# Patient Record
Sex: MALE | Race: ASIAN | NOT HISPANIC OR LATINO | URBAN - METROPOLITAN AREA
[De-identification: names, ages, dates, MRNs, and addresses within clinical notes are randomized per-mention and may not be internally consistent; named-entity substitution may affect disease eponyms.]

---

## 2019-06-25 PROBLEM — Z00.00 ENCOUNTER FOR PREVENTIVE HEALTH EXAMINATION: Status: ACTIVE | Noted: 2019-06-25

## 2020-01-23 ENCOUNTER — EMERGENCY (EMERGENCY)
Facility: HOSPITAL | Age: 75
LOS: 1 days | Discharge: HOME | End: 2020-01-23
Attending: EMERGENCY MEDICINE | Admitting: EMERGENCY MEDICINE
Payer: MEDICARE

## 2020-01-23 VITALS
HEART RATE: 97 BPM | WEIGHT: 190.04 LBS | RESPIRATION RATE: 16 BRPM | OXYGEN SATURATION: 99 % | TEMPERATURE: 98 F | HEIGHT: 68 IN | SYSTOLIC BLOOD PRESSURE: 103 MMHG | DIASTOLIC BLOOD PRESSURE: 55 MMHG

## 2020-01-23 VITALS
RESPIRATION RATE: 16 BRPM | SYSTOLIC BLOOD PRESSURE: 106 MMHG | OXYGEN SATURATION: 100 % | TEMPERATURE: 98 F | DIASTOLIC BLOOD PRESSURE: 65 MMHG | HEART RATE: 84 BPM

## 2020-01-23 LAB
ALBUMIN SERPL ELPH-MCNC: 3.6 G/DL — SIGNIFICANT CHANGE UP (ref 3.3–5)
ALP SERPL-CCNC: 84 U/L — SIGNIFICANT CHANGE UP (ref 40–120)
ALT FLD-CCNC: 27 U/L — SIGNIFICANT CHANGE UP (ref 12–78)
ANION GAP SERPL CALC-SCNC: 7 MMOL/L — SIGNIFICANT CHANGE UP (ref 5–17)
AST SERPL-CCNC: 21 U/L — SIGNIFICANT CHANGE UP (ref 15–37)
BASOPHILS # BLD AUTO: 0.05 K/UL — SIGNIFICANT CHANGE UP (ref 0–0.2)
BASOPHILS NFR BLD AUTO: 0.6 % — SIGNIFICANT CHANGE UP (ref 0–2)
BILIRUB SERPL-MCNC: 0.2 MG/DL — SIGNIFICANT CHANGE UP (ref 0.2–1.2)
BUN SERPL-MCNC: 26 MG/DL — HIGH (ref 7–23)
CALCIUM SERPL-MCNC: 8.6 MG/DL — SIGNIFICANT CHANGE UP (ref 8.5–10.1)
CHLORIDE SERPL-SCNC: 105 MMOL/L — SIGNIFICANT CHANGE UP (ref 96–108)
CO2 SERPL-SCNC: 25 MMOL/L — SIGNIFICANT CHANGE UP (ref 22–31)
CREAT SERPL-MCNC: 1.5 MG/DL — HIGH (ref 0.5–1.3)
EOSINOPHIL # BLD AUTO: 0.09 K/UL — SIGNIFICANT CHANGE UP (ref 0–0.5)
EOSINOPHIL NFR BLD AUTO: 1 % — SIGNIFICANT CHANGE UP (ref 0–6)
GLUCOSE SERPL-MCNC: 144 MG/DL — HIGH (ref 70–99)
HCT VFR BLD CALC: 38.1 % — LOW (ref 39–50)
HGB BLD-MCNC: 12.6 G/DL — LOW (ref 13–17)
IMM GRANULOCYTES NFR BLD AUTO: 0.6 % — SIGNIFICANT CHANGE UP (ref 0–1.5)
LIDOCAIN IGE QN: 117 U/L — SIGNIFICANT CHANGE UP (ref 73–393)
LYMPHOCYTES # BLD AUTO: 2.53 K/UL — SIGNIFICANT CHANGE UP (ref 1–3.3)
LYMPHOCYTES # BLD AUTO: 29.4 % — SIGNIFICANT CHANGE UP (ref 13–44)
MCHC RBC-ENTMCNC: 30.4 PG — SIGNIFICANT CHANGE UP (ref 27–34)
MCHC RBC-ENTMCNC: 33.1 GM/DL — SIGNIFICANT CHANGE UP (ref 32–36)
MCV RBC AUTO: 92 FL — SIGNIFICANT CHANGE UP (ref 80–100)
MONOCYTES # BLD AUTO: 0.7 K/UL — SIGNIFICANT CHANGE UP (ref 0–0.9)
MONOCYTES NFR BLD AUTO: 8.1 % — SIGNIFICANT CHANGE UP (ref 2–14)
NEUTROPHILS # BLD AUTO: 5.2 K/UL — SIGNIFICANT CHANGE UP (ref 1.8–7.4)
NEUTROPHILS NFR BLD AUTO: 60.3 % — SIGNIFICANT CHANGE UP (ref 43–77)
NRBC # BLD: 0 /100 WBCS — SIGNIFICANT CHANGE UP (ref 0–0)
PLATELET # BLD AUTO: 286 K/UL — SIGNIFICANT CHANGE UP (ref 150–400)
POTASSIUM SERPL-MCNC: 4.6 MMOL/L — SIGNIFICANT CHANGE UP (ref 3.5–5.3)
POTASSIUM SERPL-SCNC: 4.6 MMOL/L — SIGNIFICANT CHANGE UP (ref 3.5–5.3)
PROT SERPL-MCNC: 7 G/DL — SIGNIFICANT CHANGE UP (ref 6–8.3)
RBC # BLD: 4.14 M/UL — LOW (ref 4.2–5.8)
RBC # FLD: 12.5 % — SIGNIFICANT CHANGE UP (ref 10.3–14.5)
SODIUM SERPL-SCNC: 137 MMOL/L — SIGNIFICANT CHANGE UP (ref 135–145)
WBC # BLD: 8.62 K/UL — SIGNIFICANT CHANGE UP (ref 3.8–10.5)
WBC # FLD AUTO: 8.62 K/UL — SIGNIFICANT CHANGE UP (ref 3.8–10.5)

## 2020-01-23 PROCEDURE — 93010 ELECTROCARDIOGRAM REPORT: CPT

## 2020-01-23 PROCEDURE — 93005 ELECTROCARDIOGRAM TRACING: CPT

## 2020-01-23 PROCEDURE — 70450 CT HEAD/BRAIN W/O DYE: CPT | Mod: 26

## 2020-01-23 PROCEDURE — 96361 HYDRATE IV INFUSION ADD-ON: CPT

## 2020-01-23 PROCEDURE — 83690 ASSAY OF LIPASE: CPT

## 2020-01-23 PROCEDURE — 70450 CT HEAD/BRAIN W/O DYE: CPT

## 2020-01-23 PROCEDURE — 99284 EMERGENCY DEPT VISIT MOD MDM: CPT

## 2020-01-23 PROCEDURE — 36415 COLL VENOUS BLD VENIPUNCTURE: CPT

## 2020-01-23 PROCEDURE — 80053 COMPREHEN METABOLIC PANEL: CPT

## 2020-01-23 PROCEDURE — 96374 THER/PROPH/DIAG INJ IV PUSH: CPT

## 2020-01-23 PROCEDURE — 85027 COMPLETE CBC AUTOMATED: CPT

## 2020-01-23 PROCEDURE — 99284 EMERGENCY DEPT VISIT MOD MDM: CPT | Mod: 25

## 2020-01-23 PROCEDURE — 96375 TX/PRO/DX INJ NEW DRUG ADDON: CPT

## 2020-01-23 RX ORDER — FAMOTIDINE 10 MG/ML
20 INJECTION INTRAVENOUS ONCE
Refills: 0 | Status: COMPLETED | OUTPATIENT
Start: 2020-01-23 | End: 2020-01-23

## 2020-01-23 RX ORDER — SODIUM CHLORIDE 9 MG/ML
500 INJECTION INTRAMUSCULAR; INTRAVENOUS; SUBCUTANEOUS ONCE
Refills: 0 | Status: COMPLETED | OUTPATIENT
Start: 2020-01-23 | End: 2020-01-23

## 2020-01-23 RX ORDER — ONDANSETRON 8 MG/1
4 TABLET, FILM COATED ORAL ONCE
Refills: 0 | Status: COMPLETED | OUTPATIENT
Start: 2020-01-23 | End: 2020-01-23

## 2020-01-23 RX ORDER — MECLIZINE HCL 12.5 MG
25 TABLET ORAL ONCE
Refills: 0 | Status: COMPLETED | OUTPATIENT
Start: 2020-01-23 | End: 2020-01-23

## 2020-01-23 RX ADMIN — SODIUM CHLORIDE 500 MILLILITER(S): 9 INJECTION INTRAMUSCULAR; INTRAVENOUS; SUBCUTANEOUS at 17:29

## 2020-01-23 RX ADMIN — SODIUM CHLORIDE 500 MILLILITER(S): 9 INJECTION INTRAMUSCULAR; INTRAVENOUS; SUBCUTANEOUS at 16:29

## 2020-01-23 RX ADMIN — ONDANSETRON 4 MILLIGRAM(S): 8 TABLET, FILM COATED ORAL at 16:29

## 2020-01-23 RX ADMIN — FAMOTIDINE 20 MILLIGRAM(S): 10 INJECTION INTRAVENOUS at 16:29

## 2020-01-23 RX ADMIN — SODIUM CHLORIDE 500 MILLILITER(S): 9 INJECTION INTRAMUSCULAR; INTRAVENOUS; SUBCUTANEOUS at 19:11

## 2020-01-23 RX ADMIN — SODIUM CHLORIDE 500 MILLILITER(S): 9 INJECTION INTRAMUSCULAR; INTRAVENOUS; SUBCUTANEOUS at 18:11

## 2020-01-23 NOTE — ED PROVIDER NOTE - CHPI ED SYMPTOMS NEG
no change in level of consciousness/no weakness/no nausea/no confusion/no fever/no vomiting/no loss of consciousness

## 2020-01-23 NOTE — ED PROVIDER NOTE - PROGRESS NOTE DETAILS
All results were explained to patient and/or family and a copy of all available results given.  pt felt better c ivf

## 2020-01-23 NOTE — ED PROVIDER NOTE - NSFOLLOWUPINSTRUCTIONS_ED_ALL_ED_FT
Dehydration     Dehydration is when there is not enough fluid or water in your body. This happens when you lose more fluids than you take in. People who are age 65 or older have a higher risk of getting dehydrated.  Dehydration can range from mild to very bad. It should be treated right away to keep it from getting very bad.  Symptoms of mild dehydration may include:     Thirst.Dry lips.Slightly dry mouth.Dry, warm skin.Dizziness.Symptoms of moderate dehydration may include:     Very dry mouth.Muscle cramps.Dark pee (urine). Pee may be the color of tea.Your body making less pee.Your eyes making fewer tears.Heartbeat that is uneven or faster than normal (palpitations).Headache.Light-headedness, especially when you stand up from sitting.Fainting (syncope).Symptoms of very bad dehydration may include:     Changes in skin, such as:  Cold and clammy skin.Blotchy (mottled) or pale skin.Skin that does not quickly return to normal after being lightly pinched and let go (poor skin turgor).Changes in body fluids, such as:  Feeling very thirsty.Your eyes making fewer tears.Not sweating when body temperature is high, such as in hot weather.Your body making very little pee.Changes in vital signs, such as:  Weak pulse.Pulse that is more than 100 beats a minute when you are sitting still.Fast breathing.Low blood pressure.Other changes, such as:  Sunken eyes.Cold hands and feet.Confusion.Lack of energy (lethargy).Trouble waking up from sleep.Short-term weight loss.Unconsciousness.Follow these instructions at home:     If told by your doctor, drink an ORS:  Make an ORS by using instructions on the package.Start by drinking small amounts, about ½ cup (120 mL) every 5–10 minutes.Slowly drink more until you have had the amount that your doctor said to have.Drink enough clear fluid to keep your pee clear or pale yellow. If you were told to drink an ORS, finish the ORS first, then start slowly drinking clear fluids. Drink fluids such as:  Water. Do not drink only water by itself. Doing that can make the salt (sodium) level in your body get too low (hyponatremia).Ice chips.Fruit juice that you have added water to (diluted).Low-calorie sports drinks.Avoid:  Alcohol.Drinks that have a lot of sugar. These include high-calorie sports drinks, fruit juice that does not have water added, and soda.Caffeine.Foods that are greasy or have a lot of fat or sugar.Take over-the-counter and prescription medicines only as told by your doctor.Do not take salt tablets. Doing that can make the salt level in your body get too high (hypernatremia).Eat foods that have minerals (electrolytes). Examples include bananas, oranges, potatoes, tomatoes, and spinach.Keep all follow-up visits as told by your doctor. This is important.Contact a doctor if:  You have belly (abdominal) pain that:  Gets worse.Stays in one area (localizes).You have a rash.You have a stiff neck.You get angry or annoyed more easily than normal (irritability).You are more sleepy than normal.You have a harder time waking up than normal.You feel:  Weak.Dizzy.Very thirsty.Get help right away if:  You have symptoms of very bad dehydration.You cannot drink fluids without throwing up (vomiting).Your symptoms get worse with treatment.You have a fever.You have a very bad headache.You are throwing up or having watery poop (diarrhea) and it:  Gets worse.Does not go away.You have diarrhea for more than 24 hours.You have blood or something green (bile) in your throw-up.You have blood in your poop (stool). This may cause poop to look black and tarry.You have not peed in 6–8 hours.You have peed (urinated) only a small amount of very dark pee during 6–8 hours.You pass out (faint).Your heart rate when you are sitting still is more than 100 beats a minute.You have trouble breathing.This information is not intended to replace advice given to you by your health care provider. Make sure you discuss any questions you have with your health care provider.    Document Released: 12/06/2012 Document Revised: 07/07/2017 Document Reviewed: 02/10/2017  [employer] Interactive Patient Education © 2019 [employer]

## 2020-01-23 NOTE — ED PROVIDER NOTE - PATIENT PORTAL LINK FT
You can access the FollowMyHealth Patient Portal offered by [facility] by registering at the following website: [email or web address]. By joining [facility]’s FollowMyHealth portal, you will also be able to view your health information using other applications (apps) compatible with our system.

## 2020-01-23 NOTE — ED ADULT NURSE NOTE - OBJECTIVE STATEMENT
75 y/o male, jkorean speaking only, family at bedside to translate. as per family, pt had episode of dizziness and difficulty breathing while at home. he took his blodo pressure and it was found to be 60/40 however he is normotensive in the er. denies nausea vomiting fever chills chest pain sob headache abdominal pain and dysuria. neuro intact

## 2020-01-23 NOTE — ED ADULT NURSE REASSESSMENT NOTE - NS ED NURSE REASSESS COMMENT FT1
Report received from TIA Mantilla in [place].  Patient is pending disposition from the ER.  Patient transported to bathroom via wheelchair by family member but able to stand independently.  Patient states he is feeling ok to go home.

## 2020-01-23 NOTE — ED PROVIDER NOTE - CARE PROVIDER_API CALL
Gita Flood)  Neurology  [address] [place] [address]  Phone: [phone]  Fax: [phone]  Follow Up Time:

## 2020-01-23 NOTE — ED ADULT NURSE REASSESSMENT NOTE - NS ED NURSE REASSESS COMMENT FT1
vitals are as charted, pt does use a wheelchair for back surgery but was able to walk from wheelchair to bathroom with no dizziness, feels much better, md josh dickson aware

## 2020-01-23 NOTE — ED PROVIDER NOTE - OBJECTIVE STATEMENT
[id number].  [id number].  Today, pt with sudden onset dizzy and diaphoresis, fs was low, ate candy  c some improvement, but dizziness recurred.  pmd- [place].  Similar episodes in the past, pmd aware, but no further eval.

## 2021-01-31 ENCOUNTER — EMERGENCY (EMERGENCY)
Facility: HOSPITAL | Age: 76
LOS: 1 days | Discharge: HOME | End: 2021-01-31
Attending: EMERGENCY MEDICINE | Admitting: EMERGENCY MEDICINE
Payer: MEDICARE

## 2021-01-31 VITALS
WEIGHT: 190.04 LBS | OXYGEN SATURATION: 97 % | HEIGHT: 68 IN | RESPIRATION RATE: 16 BRPM | DIASTOLIC BLOOD PRESSURE: 84 MMHG | TEMPERATURE: 99 F | HEART RATE: 98 BPM | SYSTOLIC BLOOD PRESSURE: 137 MMHG

## 2021-01-31 VITALS
RESPIRATION RATE: 15 BRPM | TEMPERATURE: 99 F | DIASTOLIC BLOOD PRESSURE: 78 MMHG | OXYGEN SATURATION: 97 % | HEART RATE: 88 BPM | SYSTOLIC BLOOD PRESSURE: 131 MMHG

## 2021-01-31 LAB
ALBUMIN SERPL ELPH-MCNC: 4.2 G/DL — SIGNIFICANT CHANGE UP (ref 3.3–5)
ALP SERPL-CCNC: 89 U/L — SIGNIFICANT CHANGE UP (ref 30–120)
ALT FLD-CCNC: 31 U/L DA — SIGNIFICANT CHANGE UP (ref 10–60)
ANION GAP SERPL CALC-SCNC: 7 MMOL/L — SIGNIFICANT CHANGE UP (ref 5–17)
APTT BLD: 31.2 SEC — SIGNIFICANT CHANGE UP (ref 27.5–35.5)
AST SERPL-CCNC: 18 U/L — SIGNIFICANT CHANGE UP (ref 10–40)
BASOPHILS # BLD AUTO: 0.05 K/UL — SIGNIFICANT CHANGE UP (ref 0–0.2)
BASOPHILS NFR BLD AUTO: 0.3 % — SIGNIFICANT CHANGE UP (ref 0–2)
BILIRUB SERPL-MCNC: 0.5 MG/DL — SIGNIFICANT CHANGE UP (ref 0.2–1.2)
BUN SERPL-MCNC: 16 MG/DL — SIGNIFICANT CHANGE UP (ref 7–23)
CALCIUM SERPL-MCNC: 9.1 MG/DL — SIGNIFICANT CHANGE UP (ref 8.4–10.5)
CHLORIDE SERPL-SCNC: 97 MMOL/L — SIGNIFICANT CHANGE UP (ref 96–108)
CO2 SERPL-SCNC: 27 MMOL/L — SIGNIFICANT CHANGE UP (ref 22–31)
CREAT SERPL-MCNC: 1.07 MG/DL — SIGNIFICANT CHANGE UP (ref 0.5–1.3)
EOSINOPHIL # BLD AUTO: 0.1 K/UL — SIGNIFICANT CHANGE UP (ref 0–0.5)
EOSINOPHIL NFR BLD AUTO: 0.7 % — SIGNIFICANT CHANGE UP (ref 0–6)
GLUCOSE SERPL-MCNC: 126 MG/DL — HIGH (ref 70–99)
HCT VFR BLD CALC: 39.9 % — SIGNIFICANT CHANGE UP (ref 39–50)
HGB BLD-MCNC: 13.3 G/DL — SIGNIFICANT CHANGE UP (ref 13–17)
IMM GRANULOCYTES NFR BLD AUTO: 0.4 % — SIGNIFICANT CHANGE UP (ref 0–1.5)
INR BLD: 0.93 RATIO — SIGNIFICANT CHANGE UP (ref 0.88–1.16)
LACTATE SERPL-SCNC: 1.7 MMOL/L — SIGNIFICANT CHANGE UP (ref 0.7–2)
LYMPHOCYTES # BLD AUTO: 1.69 K/UL — SIGNIFICANT CHANGE UP (ref 1–3.3)
LYMPHOCYTES # BLD AUTO: 11 % — LOW (ref 13–44)
MCHC RBC-ENTMCNC: 30.4 PG — SIGNIFICANT CHANGE UP (ref 27–34)
MCHC RBC-ENTMCNC: 33.3 GM/DL — SIGNIFICANT CHANGE UP (ref 32–36)
MCV RBC AUTO: 91.3 FL — SIGNIFICANT CHANGE UP (ref 80–100)
MONOCYTES # BLD AUTO: 1.16 K/UL — HIGH (ref 0–0.9)
MONOCYTES NFR BLD AUTO: 7.6 % — SIGNIFICANT CHANGE UP (ref 2–14)
NEUTROPHILS # BLD AUTO: 12.29 K/UL — HIGH (ref 1.8–7.4)
NEUTROPHILS NFR BLD AUTO: 80 % — HIGH (ref 43–77)
NRBC # BLD: 0 /100 WBCS — SIGNIFICANT CHANGE UP (ref 0–0)
PLATELET # BLD AUTO: 215 K/UL — SIGNIFICANT CHANGE UP (ref 150–400)
POTASSIUM SERPL-MCNC: 4.1 MMOL/L — SIGNIFICANT CHANGE UP (ref 3.5–5.3)
POTASSIUM SERPL-SCNC: 4.1 MMOL/L — SIGNIFICANT CHANGE UP (ref 3.5–5.3)
PROT SERPL-MCNC: 8 G/DL — SIGNIFICANT CHANGE UP (ref 6–8.3)
PROTHROM AB SERPL-ACNC: 11.3 SEC — SIGNIFICANT CHANGE UP (ref 10.6–13.6)
RAPID RVP RESULT: SIGNIFICANT CHANGE UP
RBC # BLD: 4.37 M/UL — SIGNIFICANT CHANGE UP (ref 4.2–5.8)
RBC # FLD: 11.9 % — SIGNIFICANT CHANGE UP (ref 10.3–14.5)
SARS-COV-2 RNA SPEC QL NAA+PROBE: SIGNIFICANT CHANGE UP
SODIUM SERPL-SCNC: 131 MMOL/L — LOW (ref 135–145)
WBC # BLD: 15.35 K/UL — HIGH (ref 3.8–10.5)
WBC # FLD AUTO: 15.35 K/UL — HIGH (ref 3.8–10.5)

## 2021-01-31 PROCEDURE — 36415 COLL VENOUS BLD VENIPUNCTURE: CPT

## 2021-01-31 PROCEDURE — 85730 THROMBOPLASTIN TIME PARTIAL: CPT

## 2021-01-31 PROCEDURE — 99284 EMERGENCY DEPT VISIT MOD MDM: CPT | Mod: 25

## 2021-01-31 PROCEDURE — 70486 CT MAXILLOFACIAL W/O DYE: CPT | Mod: 26

## 2021-01-31 PROCEDURE — 0225U NFCT DS DNA&RNA 21 SARSCOV2: CPT

## 2021-01-31 PROCEDURE — 70486 CT MAXILLOFACIAL W/O DYE: CPT

## 2021-01-31 PROCEDURE — 72126 CT NECK SPINE W/DYE: CPT | Mod: 26

## 2021-01-31 PROCEDURE — 80053 COMPREHEN METABOLIC PANEL: CPT

## 2021-01-31 PROCEDURE — 99285 EMERGENCY DEPT VISIT HI MDM: CPT

## 2021-01-31 PROCEDURE — 83605 ASSAY OF LACTIC ACID: CPT

## 2021-01-31 PROCEDURE — 70460 CT HEAD/BRAIN W/DYE: CPT

## 2021-01-31 PROCEDURE — 70460 CT HEAD/BRAIN W/DYE: CPT | Mod: 26

## 2021-01-31 PROCEDURE — 85025 COMPLETE CBC W/AUTO DIFF WBC: CPT

## 2021-01-31 PROCEDURE — 72126 CT NECK SPINE W/DYE: CPT

## 2021-01-31 PROCEDURE — 87040 BLOOD CULTURE FOR BACTERIA: CPT

## 2021-01-31 PROCEDURE — 96374 THER/PROPH/DIAG INJ IV PUSH: CPT

## 2021-01-31 PROCEDURE — 85610 PROTHROMBIN TIME: CPT

## 2021-01-31 RX ORDER — AMPICILLIN SODIUM AND SULBACTAM SODIUM 250; 125 MG/ML; MG/ML
3 INJECTION, POWDER, FOR SUSPENSION INTRAMUSCULAR; INTRAVENOUS ONCE
Refills: 0 | Status: COMPLETED | OUTPATIENT
Start: 2021-01-31 | End: 2021-01-31

## 2021-01-31 RX ORDER — SODIUM CHLORIDE 9 MG/ML
500 INJECTION INTRAMUSCULAR; INTRAVENOUS; SUBCUTANEOUS ONCE
Refills: 0 | Status: COMPLETED | OUTPATIENT
Start: 2021-01-31 | End: 2021-01-31

## 2021-01-31 RX ADMIN — SODIUM CHLORIDE 500 MILLILITER(S): 9 INJECTION INTRAMUSCULAR; INTRAVENOUS; SUBCUTANEOUS at 15:26

## 2021-01-31 RX ADMIN — AMPICILLIN SODIUM AND SULBACTAM SODIUM 200 GRAM(S): 250; 125 INJECTION, POWDER, FOR SUSPENSION INTRAMUSCULAR; INTRAVENOUS at 15:26

## 2021-01-31 NOTE — ED PROVIDER NOTE - CARE PROVIDER_API CALL
Don Hartman)  Otolaryngology  [address], [address]  [place] [address]  Phone: [phone]  Fax: [phone]  Follow Up Time: 1-3 Days    Keeley Galindo)  Plastic Surgery  [address] [address]  Phone: [phone]  Fax: [phone]  Follow Up Time: 1-3 Days

## 2021-01-31 NOTE — ED PROVIDER NOTE - NSFOLLOWUPINSTRUCTIONS_ED_ALL_ED_FT
Parotitis       Parotitis means that you have irritation and swelling (inflammation) in one or both of your parotid glands. These glands make saliva. They are found on each side of your face, below and in front of your earlobes. You may or may not have pain with this condition.      What are the causes?  This condition may be caused by:  •Infections from germs (bacteria or viruses).      •Something blocking the flow of saliva through the parotid glands. This can be a stone, scar tissue, or a tumor.      •Diseases that cause your body's defense system (immune system) to attack healthy cells in your salivary glands. These are called autoimmune diseases.        What increases the risk?  You are more likely to get this condition if:  •You are 50 years old or older.       •You do not drink enough fluids (are dehydrated).       •You drink too much alcohol.     •You have:  •A dry mouth.       •Diabetes.       •Gout.      •A long-term illness.         •You do not take good care of your mouth and teeth (poor dental hygiene).      •You have had radiation treatments to the head and neck.       •You take certain medicines.        What are the signs or symptoms?  Symptoms of this condition depend on the cause. They may include:  •Swelling under and in front of the ear. This may get worse after you eat.      •Redness of the skin over the parotid gland.       •Pain and tenderness over the parotid gland. This may get worse after you eat.      •Fever or chills.       •Pus coming from the ducts inside the mouth.       •Dry mouth.       •A bad taste in the mouth.        How is this treated?  Treatment for this condition depends on the cause. Treatment may include:  •Antibiotic medicine for an infection from bacteria.      •Drinking more fluids.       •Removing a stone or obstruction.       •Treating a disease that is causing parotitis.      •Surgery to drain an infection, remove a growth, or remove the whole gland.      Treatment may not be needed if the swelling goes away with home care.      Follow these instructions at home:      Medicines      •Take over-the-counter and prescription medicines only as told by your doctor.      •If you were prescribed an antibiotic medicine, take it as told by your doctor. Do not stop taking the antibiotic even if you start to feel better.      Managing pain and swelling   •If told, put heat on the affected area. Do this as often as told by your doctor. Use the heat source that your doctor recommends, such as a moist heat pack or a heating pad.  •Place a towel between your skin and the heat source.       •Leave the heat on for 20–30 minutes.       •Remove the heat if your skin turns bright red. This is very important if you are unable to feel pain, heat, or cold. You may have a greater risk of getting burned.        •Gargle with salt water 3–4 times a day or as needed. To make salt water, dissolve ½–1 tsp (3–6 g) of salt in 1 cup (237 mL) of warm water.      •Gently rub your parotid glands as told by your doctor.        General instructions      •Drink enough fluid to keep your pee (urine) pale yellow.      •Keep your mouth clean and moist.     •Suck on sour candy. This may help to:  •Make your mouth less dry.      •Make more saliva.      •Take good care of your mouth:   •Brush your teeth at least two times a day.      •Floss your teeth every day.       •See your dentist regularly.         • Do not use any products that contain nicotine or tobacco. These include cigarettes, e-cigarettes, and chewing tobacco. If you need help quitting, ask your doctor.      • Do not drink alcohol.      •Keep all follow-up visits as told by your doctor. This is important.        Contact a doctor if:    •You have a fever or chills.      •You have new symptoms.      •Your symptoms get worse.      •Your symptoms do not get better with treatment.        Get help right away if:    •You have trouble breathing or swallowing.        Summary    •Parotitis means that you have irritation and swelling (inflammation) in one or both of your parotid glands.      •Symptoms include pain and swelling under and in front of the ear.      •Treatment for parotitis depends on the cause. In some cases, the condition may go away on its own with home care.      •You should drink plenty of fluids, take good care of your mouth, and avoid tobacco products.      This information is not intended to replace advice given to you by your health care provider. Make sure you discuss any questions you have with your health care provider.

## 2021-01-31 NOTE — ED ADULT NURSE NOTE - OBJECTIVE STATEMENT
patient has c/o left jaw pain worsen since yesterday, patient has an appointment tomorrow, no recent dental workup, denies fever, noted redness and swelling, Patient denies sick contacts/ no fever/chills/cough at this time, denies SOB and no acute distress. Pt is in no acute distress. IV accessed and tolerating. Labs drawn & sent results pending. will continue to monitor.

## 2021-01-31 NOTE — ED PROVIDER NOTE - OBJECTIVE STATEMENT
76 y/o male presents to te ED c/o left-sided facial pain which worsened today, now as swelling near the jaw area. Pt thought it was a dental issue so he scheduled for tomorrow but was unable to wait due to the pain. Has felt some nasal congestion for the past few days as well. Denies fever. Non-smoker. Takes medications for DM, HTN, HLD, prostate issues. PCP: Espinoza Arvizu. NKDA. Lives at home with wife. 76 y/o male presents to te ED c/o left-sided facial pain which worsened today, now has swelling near the left upper jaw area. Pt thought it was a dental issue so he scheduled appointment with dentist for tomorrow but was unable to wait due to the pain. Has felt some nasal congestion for the past few days as well. Denies fever. Non-smoker. Takes medications for DM, HTN, HLD, prostate issues. PCP: Espinoza Arvizu. NKDA. Lives at home with wife.

## 2021-01-31 NOTE — ED ADULT TRIAGE NOTE - CHIEF COMPLAINT QUOTE
" I have pain and swelling on my left face started yesterday "  RN Ryan Boucher interpreted for triage

## 2021-01-31 NOTE — ED PROVIDER NOTE - PATIENT PORTAL LINK FT
You can access the FollowMyHealth Patient Portal offered by [facility] by registering at the following website: [email or web address]. By joining [employer]’s FollowMyHealth portal, you will also be able to view your health information using other applications (apps) compatible with our system.

## 2021-01-31 NOTE — ED PROVIDER NOTE - PROGRESS NOTE DETAILS
CT reveals parotid swelling. No discrete abscess. Plan - spoke with pts son Fred. He and patient would prefer PO antibiotics and avoid hospitalization if possible due to pandemic. Plan - IV Unasyn, Rx Augmentin. Close ENT/Plastic surgery follow up. May need drainage if abscess develops or symptoms worsen.  Pt seen by Plastic surgery on call Dr Galindo who agrees with plan.

## 2021-01-31 NOTE — ED PROVIDER NOTE - ENMT, MLM
Moderate amount of selling, tenderness and erythema over left face in area of the parotid gland and preauricular lymph nodes. No fluctuance or drainage. Neck supple, nontender, no scalp lesions or ear swelling. TMs visible and clear. Oropharynx clear, no sign of dental issue.

## 2021-01-31 NOTE — ED PROVIDER NOTE - CLINICAL SUMMARY MEDICAL DECISION MAKING FREE TEXT BOX
76 y/o male with left facial swelling. Rule out adenitis, rule out partitis, will do labs with CT, if no abscess treat with antibiotics and follow-up ENT. 76 y/o male with left facial swelling. Rule out adenitis, rule out parotitis, will do labs and CT, if no abscess treat with antibiotics and follow-up ENT.

## 2021-01-31 NOTE — ED PROVIDER NOTE - PROVIDER TOKENS
PROVIDER:[TOKEN:[[id number]],FOLLOWUP:[1-3 Days]],PROVIDER:[TOKEN:[[id number]],FOLLOWUP:[1-3 Days]]

## 2021-02-01 PROBLEM — E11.9 TYPE 2 DIABETES MELLITUS WITHOUT COMPLICATIONS: Chronic | Status: ACTIVE | Noted: 2020-01-23

## 2021-02-01 PROBLEM — E78.5 HYPERLIPIDEMIA, UNSPECIFIED: Chronic | Status: ACTIVE | Noted: 2020-01-23

## 2021-02-01 PROBLEM — I10 ESSENTIAL (PRIMARY) HYPERTENSION: Chronic | Status: ACTIVE | Noted: 2020-01-23

## 2021-02-02 ENCOUNTER — EMERGENCY (EMERGENCY)
Facility: HOSPITAL | Age: 76
LOS: 1 days | Discharge: ANOTHER HOSPITAL | End: 2021-02-02
Attending: STUDENT IN AN ORGANIZED HEALTH CARE EDUCATION/TRAINING PROGRAM | Admitting: STUDENT IN AN ORGANIZED HEALTH CARE EDUCATION/TRAINING PROGRAM
Payer: MEDICARE

## 2021-02-02 ENCOUNTER — INPATIENT (INPATIENT)
Facility: HOSPITAL | Age: 76
LOS: 1 days | Discharge: HOME | End: 2021-02-04
Attending: HOSPITALIST | Admitting: HOSPITALIST
Payer: COMMERCIAL

## 2021-02-02 VITALS
RESPIRATION RATE: 16 BRPM | HEIGHT: 68 IN | OXYGEN SATURATION: 98 % | TEMPERATURE: 98 F | DIASTOLIC BLOOD PRESSURE: 88 MMHG | WEIGHT: 190.04 LBS | HEART RATE: 104 BPM | SYSTOLIC BLOOD PRESSURE: 120 MMHG

## 2021-02-02 VITALS
RESPIRATION RATE: 15 BRPM | OXYGEN SATURATION: 97 % | DIASTOLIC BLOOD PRESSURE: 63 MMHG | SYSTOLIC BLOOD PRESSURE: 115 MMHG | HEART RATE: 102 BPM | TEMPERATURE: 99 F

## 2021-02-02 VITALS
DIASTOLIC BLOOD PRESSURE: 79 MMHG | OXYGEN SATURATION: 95 % | HEART RATE: 90 BPM | TEMPERATURE: 99 F | SYSTOLIC BLOOD PRESSURE: 125 MMHG | HEIGHT: 68 IN

## 2021-02-02 LAB
ALBUMIN SERPL ELPH-MCNC: 3.5 G/DL — SIGNIFICANT CHANGE UP (ref 3.3–5)
ALP SERPL-CCNC: 70 U/L — SIGNIFICANT CHANGE UP (ref 30–120)
ALT FLD-CCNC: 25 U/L DA — SIGNIFICANT CHANGE UP (ref 10–60)
ANION GAP SERPL CALC-SCNC: 10 MMOL/L — SIGNIFICANT CHANGE UP (ref 5–17)
APTT BLD: 29.9 SEC — SIGNIFICANT CHANGE UP (ref 27.5–35.5)
AST SERPL-CCNC: 17 U/L — SIGNIFICANT CHANGE UP (ref 10–40)
BASOPHILS # BLD AUTO: 0.03 K/UL — SIGNIFICANT CHANGE UP (ref 0–0.2)
BASOPHILS NFR BLD AUTO: 0.2 % — SIGNIFICANT CHANGE UP (ref 0–2)
BILIRUB SERPL-MCNC: 0.6 MG/DL — SIGNIFICANT CHANGE UP (ref 0.2–1.2)
BUN SERPL-MCNC: 15 MG/DL — SIGNIFICANT CHANGE UP (ref 7–23)
CALCIUM SERPL-MCNC: 8.7 MG/DL — SIGNIFICANT CHANGE UP (ref 8.4–10.5)
CHLORIDE SERPL-SCNC: 91 MMOL/L — LOW (ref 96–108)
CO2 SERPL-SCNC: 23 MMOL/L — SIGNIFICANT CHANGE UP (ref 22–31)
CREAT SERPL-MCNC: 0.97 MG/DL — SIGNIFICANT CHANGE UP (ref 0.5–1.3)
EOSINOPHIL # BLD AUTO: 0.01 K/UL — SIGNIFICANT CHANGE UP (ref 0–0.5)
EOSINOPHIL NFR BLD AUTO: 0.1 % — SIGNIFICANT CHANGE UP (ref 0–6)
ERYTHROCYTE [SEDIMENTATION RATE] IN BLOOD: 23 MM/HR — HIGH (ref 0–9)
GLUCOSE SERPL-MCNC: 265 MG/DL — HIGH (ref 70–99)
HCT VFR BLD CALC: 34.7 % — LOW (ref 39–50)
HGB BLD-MCNC: 11.6 G/DL — LOW (ref 13–17)
IMM GRANULOCYTES NFR BLD AUTO: 0.4 % — SIGNIFICANT CHANGE UP (ref 0–1.5)
INR BLD: 1.03 RATIO — SIGNIFICANT CHANGE UP (ref 0.88–1.16)
LACTATE SERPL-SCNC: 1.1 MMOL/L — SIGNIFICANT CHANGE UP (ref 0.7–2)
LACTATE SERPL-SCNC: 2.4 MMOL/L — HIGH (ref 0.7–2)
LYMPHOCYTES # BLD AUTO: 1.45 K/UL — SIGNIFICANT CHANGE UP (ref 1–3.3)
LYMPHOCYTES # BLD AUTO: 10.3 % — LOW (ref 13–44)
MCHC RBC-ENTMCNC: 30.4 PG — SIGNIFICANT CHANGE UP (ref 27–34)
MCHC RBC-ENTMCNC: 33.4 GM/DL — SIGNIFICANT CHANGE UP (ref 32–36)
MCV RBC AUTO: 90.8 FL — SIGNIFICANT CHANGE UP (ref 80–100)
MONOCYTES # BLD AUTO: 0.81 K/UL — SIGNIFICANT CHANGE UP (ref 0–0.9)
MONOCYTES NFR BLD AUTO: 5.7 % — SIGNIFICANT CHANGE UP (ref 2–14)
NEUTROPHILS # BLD AUTO: 11.76 K/UL — HIGH (ref 1.8–7.4)
NEUTROPHILS NFR BLD AUTO: 83.3 % — HIGH (ref 43–77)
NRBC # BLD: 0 /100 WBCS — SIGNIFICANT CHANGE UP (ref 0–0)
PLATELET # BLD AUTO: 210 K/UL — SIGNIFICANT CHANGE UP (ref 150–400)
POTASSIUM SERPL-MCNC: 4.1 MMOL/L — SIGNIFICANT CHANGE UP (ref 3.5–5.3)
POTASSIUM SERPL-SCNC: 4.1 MMOL/L — SIGNIFICANT CHANGE UP (ref 3.5–5.3)
PROT SERPL-MCNC: 7.3 G/DL — SIGNIFICANT CHANGE UP (ref 6–8.3)
PROTHROM AB SERPL-ACNC: 12.4 SEC — SIGNIFICANT CHANGE UP (ref 10.6–13.6)
RBC # BLD: 3.82 M/UL — LOW (ref 4.2–5.8)
RBC # FLD: 11.9 % — SIGNIFICANT CHANGE UP (ref 10.3–14.5)
SARS-COV-2 RNA SPEC QL NAA+PROBE: SIGNIFICANT CHANGE UP
SODIUM SERPL-SCNC: 124 MMOL/L — LOW (ref 135–145)
WBC # BLD: 14.12 K/UL — HIGH (ref 3.8–10.5)
WBC # FLD AUTO: 14.12 K/UL — HIGH (ref 3.8–10.5)

## 2021-02-02 PROCEDURE — 99285 EMERGENCY DEPT VISIT HI MDM: CPT

## 2021-02-02 PROCEDURE — 96366 THER/PROPH/DIAG IV INF ADDON: CPT

## 2021-02-02 PROCEDURE — 85730 THROMBOPLASTIN TIME PARTIAL: CPT

## 2021-02-02 PROCEDURE — 76536 US EXAM OF HEAD AND NECK: CPT

## 2021-02-02 PROCEDURE — 93880 EXTRACRANIAL BILAT STUDY: CPT

## 2021-02-02 PROCEDURE — 86140 C-REACTIVE PROTEIN: CPT

## 2021-02-02 PROCEDURE — 93010 ELECTROCARDIOGRAM REPORT: CPT

## 2021-02-02 PROCEDURE — 87040 BLOOD CULTURE FOR BACTERIA: CPT

## 2021-02-02 PROCEDURE — 85025 COMPLETE CBC W/AUTO DIFF WBC: CPT

## 2021-02-02 PROCEDURE — 85652 RBC SED RATE AUTOMATED: CPT

## 2021-02-02 PROCEDURE — 93005 ELECTROCARDIOGRAM TRACING: CPT

## 2021-02-02 PROCEDURE — 99285 EMERGENCY DEPT VISIT HI MDM: CPT | Mod: 25

## 2021-02-02 PROCEDURE — 96365 THER/PROPH/DIAG IV INF INIT: CPT

## 2021-02-02 PROCEDURE — 80053 COMPREHEN METABOLIC PANEL: CPT

## 2021-02-02 PROCEDURE — 93880 EXTRACRANIAL BILAT STUDY: CPT | Mod: 26,59

## 2021-02-02 PROCEDURE — 83605 ASSAY OF LACTIC ACID: CPT

## 2021-02-02 PROCEDURE — U0003: CPT

## 2021-02-02 PROCEDURE — 36415 COLL VENOUS BLD VENIPUNCTURE: CPT

## 2021-02-02 PROCEDURE — 96367 TX/PROPH/DG ADDL SEQ IV INF: CPT

## 2021-02-02 PROCEDURE — 85610 PROTHROMBIN TIME: CPT

## 2021-02-02 PROCEDURE — U0005: CPT

## 2021-02-02 PROCEDURE — 76536 US EXAM OF HEAD AND NECK: CPT | Mod: 26

## 2021-02-02 RX ORDER — SODIUM CHLORIDE 9 MG/ML
2100 INJECTION INTRAMUSCULAR; INTRAVENOUS; SUBCUTANEOUS ONCE
Refills: 0 | Status: COMPLETED | OUTPATIENT
Start: 2021-02-02 | End: 2021-02-02

## 2021-02-02 RX ORDER — VANCOMYCIN HCL 1 G
1250 VIAL (EA) INTRAVENOUS ONCE
Refills: 0 | Status: COMPLETED | OUTPATIENT
Start: 2021-02-02 | End: 2021-02-02

## 2021-02-02 RX ORDER — PIPERACILLIN AND TAZOBACTAM 4; .5 G/20ML; G/20ML
3.38 INJECTION, POWDER, LYOPHILIZED, FOR SOLUTION INTRAVENOUS ONCE
Refills: 0 | Status: COMPLETED | OUTPATIENT
Start: 2021-02-02 | End: 2021-02-02

## 2021-02-02 RX ORDER — MORPHINE SULFATE 50 MG/1
4 CAPSULE, EXTENDED RELEASE ORAL ONCE
Refills: 0 | Status: DISCONTINUED | OUTPATIENT
Start: 2021-02-02 | End: 2021-02-02

## 2021-02-02 RX ORDER — ACETAMINOPHEN 500 MG
650 TABLET ORAL ONCE
Refills: 0 | Status: COMPLETED | OUTPATIENT
Start: 2021-02-02 | End: 2021-02-02

## 2021-02-02 RX ORDER — VANCOMYCIN HCL 1 G
1300 VIAL (EA) INTRAVENOUS ONCE
Refills: 0 | Status: DISCONTINUED | OUTPATIENT
Start: 2021-02-02 | End: 2021-02-02

## 2021-02-02 RX ADMIN — Medication 1250 MILLIGRAM(S): at 20:10

## 2021-02-02 RX ADMIN — PIPERACILLIN AND TAZOBACTAM 200 GRAM(S): 4; .5 INJECTION, POWDER, LYOPHILIZED, FOR SOLUTION INTRAVENOUS at 17:53

## 2021-02-02 RX ADMIN — SODIUM CHLORIDE 2100 MILLILITER(S): 9 INJECTION INTRAMUSCULAR; INTRAVENOUS; SUBCUTANEOUS at 20:00

## 2021-02-02 RX ADMIN — PIPERACILLIN AND TAZOBACTAM 3.38 GRAM(S): 4; .5 INJECTION, POWDER, LYOPHILIZED, FOR SOLUTION INTRAVENOUS at 18:27

## 2021-02-02 RX ADMIN — SODIUM CHLORIDE 2100 MILLILITER(S): 9 INJECTION INTRAMUSCULAR; INTRAVENOUS; SUBCUTANEOUS at 17:53

## 2021-02-02 RX ADMIN — Medication 166.67 MILLIGRAM(S): at 17:53

## 2021-02-02 NOTE — ED ADULT NURSE NOTE - OBJECTIVE STATEMENT
patient has c/o left jaw pain, patient has started antibiotic for jaw pain/swelling today but got worse, denies gum pain/ dental problem, seen by ENT and came to ed for further eval, IV accessed and tolerating. Labs drawn & sent results pending. noted redness and it is moved to his neck today, will continue to monitor.
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
## 2021-02-02 NOTE — ED ADULT NURSE NOTE - CHIEF COMPLAINT QUOTE
Transferred from [place] for ENT and IV abx. L side of neck swollen x3 days. Pt has no complaints, no distress noted, no drooling noted. Arrives with 20g IV in L hand. Hx HTN, DM, HLD
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
## 2021-02-02 NOTE — ED ADULT TRIAGE NOTE - CHIEF COMPLAINT QUOTE
Transferred from [place] for ENT and IV abx. L side of neck swollen x3 days. Pt has no complaints, no distress noted, no drooling noted. Arrives with 20g IV in L hand. Hx HTN, DM, HLD

## 2021-02-02 NOTE — ED PROVIDER NOTE - PROGRESS NOTE DETAILS
HARJIT BETANCUR: pt seen by ent, they sugest abx and admisiion, pt will be admitted to medicine service
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
## 2021-02-02 NOTE — ED PROVIDER NOTE - NSSERVICENOTAVAIL_ED_A_ED_FT
Spoke with patient to see how she was doing.   She states that she is still having brown colored discharge appearance looks \"mucous, gooey, sticky, stringy\" appearance.  -No cramping, or abdominal pain  -No back pain   --No odor.    She did get confirmed for a viable pregnancy by her OB and she is currently at 5 weeks. Ob is not able to see her until she is 8 weeks and she is wondering what this could be.     ENT
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
## 2021-02-02 NOTE — ED PROVIDER NOTE - OBJECTIVE STATEMENT
75 M history of DM, HTN, HLD here with worsening facial swelling. Was seen here 2 days ago, found to have parotitis and discharged with prescription for antibiotics which he was not able to . He saw Dr. Dionne Cancino who recommended he return for admission and IV antibiotics. He report increased pain as well as dizziness. He feels he cannot open his mouth fully. No fevers, chills, nausea, vomiting, drooling, difficulty swallowing or breathing.

## 2021-02-02 NOTE — ED PROVIDER NOTE - PHYSICAL EXAMINATION
Constitutional: No fever.  Neurological: No headache.  Eyes: No vision changes.   Ears, Nose, Mouth, Throat: oral pharynx clear, no elevation of tongue, no tonsillar swelling or exudate. No trismus. erythema, tenderness and swelling of left face at angle of jaw extending down neck and across to the right neck. No crepitus. No palpable cord.  Cardiovascular: No chest pain.  Respiratory: No difficulty breathing.  Gastrointestinal: No nausea or vomiting.  Genitourinary: No dysuria.  Musculoskeletal: No joint pain.  Integumentary (skin and/or breast): No rash.

## 2021-02-02 NOTE — ED PROVIDER NOTE - CLINICAL SUMMARY MEDICAL DECISION MAKING FREE TEXT BOX
worsening neck infection- now with tachycardia. Will treat as sepsis. repeat labs / imaging, discussed with ENT via transfer center.

## 2021-02-02 NOTE — ED PROVIDER NOTE - PHYSICAL EXAMINATION
left cheek + swelling, + tenderness, + erythema,  neck + erythematous rash going from cheek down neck, + warm, - tender

## 2021-02-02 NOTE — ED ADULT NURSE NOTE - NSIMPLEMENTINTERV_GEN_ALL_ED
Implemented All Fall Risk Interventions:  [place] to call system. Call bell, personal items and telephone within reach. Instruct patient to call for assistance. Room bathroom lighting operational. Non-slip footwear when patient is off stretcher. Physically safe environment: no spills, clutter or unnecessary equipment. Stretcher in lowest position, wheels locked, appropriate side rails in place. Provide visual cue, wrist band, yellow gown, etc. Monitor gait and stability. Monitor for mental status changes and reorient to person, place, and time. Review medications for side effects contributing to fall risk. Reinforce activity limits and safety measures with patient and family.

## 2021-02-02 NOTE — ED PROVIDER NOTE - NS ED ROS FT
Constitutional: No fever.  Neurological: No headache. + dizziness.  Eyes: No vision changes.   Ears, Nose, Mouth, Throat: + face pain  Cardiovascular: No chest pain.  Respiratory: No difficulty breathing.  Gastrointestinal: No nausea or vomiting.  Genitourinary: No dysuria.  Musculoskeletal: No joint pain.  Integumentary (skin and/or breast): No rash.

## 2021-02-02 NOTE — ED PROVIDER NOTE - PMH
Diabetes mellitus    DM (diabetes mellitus)    HLD (hyperlipidemia)    HTN (hypertension)    Hyperlipidemia    Hypertension

## 2021-02-03 ENCOUNTER — TRANSCRIPTION ENCOUNTER (OUTPATIENT)
Age: 76
End: 2021-02-03

## 2021-02-03 DIAGNOSIS — K11.20 SIALOADENITIS, UNSPECIFIED: ICD-10-CM

## 2021-02-03 DIAGNOSIS — E11.65 TYPE 2 DIABETES MELLITUS WITH HYPERGLYCEMIA: ICD-10-CM

## 2021-02-03 DIAGNOSIS — I10 ESSENTIAL (PRIMARY) HYPERTENSION: ICD-10-CM

## 2021-02-03 DIAGNOSIS — E87.1 HYPO-OSMOLALITY AND HYPONATREMIA: ICD-10-CM

## 2021-02-03 DIAGNOSIS — Z02.9 ENCOUNTER FOR ADMINISTRATIVE EXAMINATIONS, UNSPECIFIED: ICD-10-CM

## 2021-02-03 DIAGNOSIS — N40.0 BENIGN PROSTATIC HYPERPLASIA WITHOUT LOWER URINARY TRACT SYMPTOMS: ICD-10-CM

## 2021-02-03 DIAGNOSIS — Z29.9 ENCOUNTER FOR PROPHYLACTIC MEASURES, UNSPECIFIED: ICD-10-CM

## 2021-02-03 DIAGNOSIS — E78.5 HYPERLIPIDEMIA, UNSPECIFIED: ICD-10-CM

## 2021-02-03 PROBLEM — E11.9 TYPE 2 DIABETES MELLITUS WITHOUT COMPLICATIONS: Chronic | Status: ACTIVE | Noted: 2021-01-31

## 2021-02-03 LAB
A1C WITH ESTIMATED AVERAGE GLUCOSE RESULT: 7 % — HIGH (ref 4–5.6)
ANION GAP SERPL CALC-SCNC: 11 MMOL/L — SIGNIFICANT CHANGE UP (ref 7–14)
BASE EXCESS BLDV CALC-SCNC: 0.2 MMOL/L — SIGNIFICANT CHANGE UP (ref -3–2)
BASOPHILS # BLD AUTO: 0.04 K/UL — SIGNIFICANT CHANGE UP (ref 0–0.2)
BASOPHILS NFR BLD AUTO: 0.4 % — SIGNIFICANT CHANGE UP (ref 0–2)
BLOOD GAS VENOUS - CREATININE: 1 MG/DL — SIGNIFICANT CHANGE UP (ref 0.5–1.3)
BLOOD GAS VENOUS COMPREHENSIVE RESULT: SIGNIFICANT CHANGE UP
BUN SERPL-MCNC: 11 MG/DL — SIGNIFICANT CHANGE UP (ref 7–23)
CALCIUM SERPL-MCNC: 9.2 MG/DL — SIGNIFICANT CHANGE UP (ref 8.4–10.5)
CHLORIDE BLDV-SCNC: 105 MMOL/L — SIGNIFICANT CHANGE UP (ref 96–108)
CHLORIDE SERPL-SCNC: 99 MMOL/L — SIGNIFICANT CHANGE UP (ref 98–107)
CHOLEST SERPL-MCNC: 126 MG/DL — SIGNIFICANT CHANGE UP
CO2 SERPL-SCNC: 21 MMOL/L — LOW (ref 22–31)
CREAT SERPL-MCNC: 0.95 MG/DL — SIGNIFICANT CHANGE UP (ref 0.5–1.3)
CRP SERPL-MCNC: 7.94 MG/DL — HIGH (ref 0–0.4)
EOSINOPHIL # BLD AUTO: 0.09 K/UL — SIGNIFICANT CHANGE UP (ref 0–0.5)
EOSINOPHIL NFR BLD AUTO: 1 % — SIGNIFICANT CHANGE UP (ref 0–6)
ESTIMATED AVERAGE GLUCOSE: 154 MG/DL — HIGH (ref 68–114)
GAS PNL BLDV: 132 MMOL/L — LOW (ref 136–146)
GLUCOSE BLDC GLUCOMTR-MCNC: 115 MG/DL — HIGH (ref 70–99)
GLUCOSE BLDC GLUCOMTR-MCNC: 127 MG/DL — HIGH (ref 70–99)
GLUCOSE BLDC GLUCOMTR-MCNC: 179 MG/DL — HIGH (ref 70–99)
GLUCOSE BLDV-MCNC: 129 MG/DL — HIGH (ref 70–99)
GLUCOSE SERPL-MCNC: 121 MG/DL — HIGH (ref 70–99)
HCO3 BLDV-SCNC: 24 MMOL/L — SIGNIFICANT CHANGE UP (ref 20–27)
HCT VFR BLD CALC: 36.7 % — LOW (ref 39–50)
HCT VFR BLDA CALC: 39.1 % — SIGNIFICANT CHANGE UP (ref 39–51)
HDLC SERPL-MCNC: 44 MG/DL — SIGNIFICANT CHANGE UP
HGB BLD CALC-MCNC: 12.7 G/DL — LOW (ref 13–17)
HGB BLD-MCNC: 11.9 G/DL — LOW (ref 13–17)
IANC: 5.77 K/UL — SIGNIFICANT CHANGE UP (ref 1.5–8.5)
IMM GRANULOCYTES NFR BLD AUTO: 0.2 % — SIGNIFICANT CHANGE UP (ref 0–1.5)
LACTATE BLDV-MCNC: 1.3 MMOL/L — SIGNIFICANT CHANGE UP (ref 0.5–2)
LIPID PNL WITH DIRECT LDL SERPL: 63 MG/DL — SIGNIFICANT CHANGE UP
LYMPHOCYTES # BLD AUTO: 2.54 K/UL — SIGNIFICANT CHANGE UP (ref 1–3.3)
LYMPHOCYTES # BLD AUTO: 27.4 % — SIGNIFICANT CHANGE UP (ref 13–44)
MAGNESIUM SERPL-MCNC: 2.2 MG/DL — SIGNIFICANT CHANGE UP (ref 1.6–2.6)
MCHC RBC-ENTMCNC: 30 PG — SIGNIFICANT CHANGE UP (ref 27–34)
MCHC RBC-ENTMCNC: 32.4 GM/DL — SIGNIFICANT CHANGE UP (ref 32–36)
MCV RBC AUTO: 92.4 FL — SIGNIFICANT CHANGE UP (ref 80–100)
MONOCYTES # BLD AUTO: 0.82 K/UL — SIGNIFICANT CHANGE UP (ref 0–0.9)
MONOCYTES NFR BLD AUTO: 8.8 % — SIGNIFICANT CHANGE UP (ref 2–14)
NEUTROPHILS # BLD AUTO: 5.77 K/UL — SIGNIFICANT CHANGE UP (ref 1.8–7.4)
NEUTROPHILS NFR BLD AUTO: 62.2 % — SIGNIFICANT CHANGE UP (ref 43–77)
NON HDL CHOLESTEROL: 82 MG/DL — SIGNIFICANT CHANGE UP
NRBC # BLD: 0 /100 WBCS — SIGNIFICANT CHANGE UP
NRBC # FLD: 0 K/UL — SIGNIFICANT CHANGE UP
PCO2 BLDV: 41 MMHG — SIGNIFICANT CHANGE UP (ref 41–51)
PH BLDV: 7.39 — SIGNIFICANT CHANGE UP (ref 7.32–7.43)
PHOSPHATE SERPL-MCNC: 3.1 MG/DL — SIGNIFICANT CHANGE UP (ref 2.5–4.5)
PLATELET # BLD AUTO: 239 K/UL — SIGNIFICANT CHANGE UP (ref 150–400)
PO2 BLDV: 74 MMHG — HIGH (ref 35–40)
POTASSIUM BLDV-SCNC: 4 MMOL/L — SIGNIFICANT CHANGE UP (ref 3.4–4.5)
POTASSIUM SERPL-MCNC: 4.2 MMOL/L — SIGNIFICANT CHANGE UP (ref 3.5–5.3)
POTASSIUM SERPL-SCNC: 4.2 MMOL/L — SIGNIFICANT CHANGE UP (ref 3.5–5.3)
RBC # BLD: 3.97 M/UL — LOW (ref 4.2–5.8)
RBC # FLD: 12.1 % — SIGNIFICANT CHANGE UP (ref 10.3–14.5)
SAO2 % BLDV: 93.4 % — HIGH (ref 60–85)
SARS-COV-2 IGG SERPL QL IA: NEGATIVE — SIGNIFICANT CHANGE UP
SARS-COV-2 IGM SERPL IA-ACNC: 0.07 INDEX — SIGNIFICANT CHANGE UP
SODIUM SERPL-SCNC: 131 MMOL/L — LOW (ref 135–145)
TRIGL SERPL-MCNC: 97 MG/DL — SIGNIFICANT CHANGE UP
TSH SERPL-MCNC: 4.31 UIU/ML — HIGH (ref 0.27–4.2)
WBC # BLD: 9.28 K/UL — SIGNIFICANT CHANGE UP (ref 3.8–10.5)
WBC # FLD AUTO: 9.28 K/UL — SIGNIFICANT CHANGE UP (ref 3.8–10.5)

## 2021-02-03 PROCEDURE — 99223 1ST HOSP IP/OBS HIGH 75: CPT | Mod: GC

## 2021-02-03 PROCEDURE — 99285 EMERGENCY DEPT VISIT HI MDM: CPT

## 2021-02-03 PROCEDURE — 12345: CPT | Mod: NC,GC

## 2021-02-03 RX ORDER — FINASTERIDE 5 MG/1
5 TABLET, FILM COATED ORAL DAILY
Refills: 0 | Status: DISCONTINUED | OUTPATIENT
Start: 2021-02-03 | End: 2021-02-04

## 2021-02-03 RX ORDER — GABAPENTIN 400 MG/1
0 CAPSULE ORAL
Qty: 0 | Refills: 0 | DISCHARGE

## 2021-02-03 RX ORDER — ENOXAPARIN SODIUM 100 MG/ML
40 INJECTION SUBCUTANEOUS DAILY
Refills: 0 | Status: DISCONTINUED | OUTPATIENT
Start: 2021-02-03 | End: 2021-02-04

## 2021-02-03 RX ORDER — DEXTROSE 50 % IN WATER 50 %
25 SYRINGE (ML) INTRAVENOUS ONCE
Refills: 0 | Status: DISCONTINUED | OUTPATIENT
Start: 2021-02-03 | End: 2021-02-04

## 2021-02-03 RX ORDER — GABAPENTIN 400 MG/1
300 CAPSULE ORAL EVERY 24 HOURS
Refills: 0 | Status: DISCONTINUED | OUTPATIENT
Start: 2021-02-03 | End: 2021-02-04

## 2021-02-03 RX ORDER — SODIUM CHLORIDE 9 MG/ML
1000 INJECTION, SOLUTION INTRAVENOUS
Refills: 0 | Status: DISCONTINUED | OUTPATIENT
Start: 2021-02-03 | End: 2021-02-04

## 2021-02-03 RX ORDER — GABAPENTIN 400 MG/1
300 CAPSULE ORAL EVERY 24 HOURS
Refills: 0 | Status: DISCONTINUED | OUTPATIENT
Start: 2021-02-03 | End: 2021-02-03

## 2021-02-03 RX ORDER — LISINOPRIL 2.5 MG/1
10 TABLET ORAL DAILY
Refills: 0 | Status: DISCONTINUED | OUTPATIENT
Start: 2021-02-03 | End: 2021-02-04

## 2021-02-03 RX ORDER — GLUCAGON INJECTION, SOLUTION 0.5 MG/.1ML
1 INJECTION, SOLUTION SUBCUTANEOUS ONCE
Refills: 0 | Status: DISCONTINUED | OUTPATIENT
Start: 2021-02-03 | End: 2021-02-04

## 2021-02-03 RX ORDER — INSULIN LISPRO 100/ML
VIAL (ML) SUBCUTANEOUS
Refills: 0 | Status: DISCONTINUED | OUTPATIENT
Start: 2021-02-03 | End: 2021-02-04

## 2021-02-03 RX ORDER — SODIUM CHLORIDE 9 MG/ML
3 INJECTION INTRAMUSCULAR; INTRAVENOUS; SUBCUTANEOUS EVERY 8 HOURS
Refills: 0 | Status: DISCONTINUED | OUTPATIENT
Start: 2021-02-03 | End: 2021-02-04

## 2021-02-03 RX ORDER — TAMSULOSIN HYDROCHLORIDE 0.4 MG/1
0.4 CAPSULE ORAL AT BEDTIME
Refills: 0 | Status: DISCONTINUED | OUTPATIENT
Start: 2021-02-03 | End: 2021-02-04

## 2021-02-03 RX ORDER — LISINOPRIL 2.5 MG/1
10 TABLET ORAL DAILY
Refills: 0 | Status: DISCONTINUED | OUTPATIENT
Start: 2021-02-03 | End: 2021-02-03

## 2021-02-03 RX ORDER — INSULIN LISPRO 100/ML
VIAL (ML) SUBCUTANEOUS AT BEDTIME
Refills: 0 | Status: DISCONTINUED | OUTPATIENT
Start: 2021-02-03 | End: 2021-02-04

## 2021-02-03 RX ORDER — SIMVASTATIN 20 MG/1
40 TABLET, FILM COATED ORAL AT BEDTIME
Refills: 0 | Status: DISCONTINUED | OUTPATIENT
Start: 2021-02-03 | End: 2021-02-04

## 2021-02-03 RX ORDER — METFORMIN HYDROCHLORIDE 850 MG/1
0 TABLET ORAL
Qty: 0 | Refills: 0 | DISCHARGE

## 2021-02-03 RX ORDER — DEXTROSE 50 % IN WATER 50 %
15 SYRINGE (ML) INTRAVENOUS ONCE
Refills: 0 | Status: DISCONTINUED | OUTPATIENT
Start: 2021-02-03 | End: 2021-02-04

## 2021-02-03 RX ORDER — DEXTROSE 50 % IN WATER 50 %
12.5 SYRINGE (ML) INTRAVENOUS ONCE
Refills: 0 | Status: DISCONTINUED | OUTPATIENT
Start: 2021-02-03 | End: 2021-02-04

## 2021-02-03 RX ADMIN — Medication 1: at 15:48

## 2021-02-03 RX ADMIN — FINASTERIDE 5 MILLIGRAM(S): 5 TABLET, FILM COATED ORAL at 16:36

## 2021-02-03 RX ADMIN — Medication 100 MILLIGRAM(S): at 05:42

## 2021-02-03 RX ADMIN — MORPHINE SULFATE 4 MILLIGRAM(S): 50 CAPSULE, EXTENDED RELEASE ORAL at 00:19

## 2021-02-03 RX ADMIN — ENOXAPARIN SODIUM 40 MILLIGRAM(S): 100 INJECTION SUBCUTANEOUS at 16:36

## 2021-02-03 RX ADMIN — TAMSULOSIN HYDROCHLORIDE 0.4 MILLIGRAM(S): 0.4 CAPSULE ORAL at 21:34

## 2021-02-03 RX ADMIN — GABAPENTIN 300 MILLIGRAM(S): 400 CAPSULE ORAL at 16:36

## 2021-02-03 RX ADMIN — SIMVASTATIN 40 MILLIGRAM(S): 20 TABLET, FILM COATED ORAL at 21:34

## 2021-02-03 RX ADMIN — LISINOPRIL 10 MILLIGRAM(S): 2.5 TABLET ORAL at 05:42

## 2021-02-03 RX ADMIN — SODIUM CHLORIDE 3 MILLILITER(S): 9 INJECTION INTRAMUSCULAR; INTRAVENOUS; SUBCUTANEOUS at 21:34

## 2021-02-03 RX ADMIN — PIPERACILLIN AND TAZOBACTAM 200 GRAM(S): 4; .5 INJECTION, POWDER, LYOPHILIZED, FOR SOLUTION INTRAVENOUS at 00:38

## 2021-02-03 RX ADMIN — Medication 100 MILLIGRAM(S): at 21:33

## 2021-02-03 RX ADMIN — SODIUM CHLORIDE 3 MILLILITER(S): 9 INJECTION INTRAMUSCULAR; INTRAVENOUS; SUBCUTANEOUS at 05:08

## 2021-02-03 RX ADMIN — SODIUM CHLORIDE 3 MILLILITER(S): 9 INJECTION INTRAMUSCULAR; INTRAVENOUS; SUBCUTANEOUS at 15:36

## 2021-02-03 RX ADMIN — Medication 100 MILLIGRAM(S): at 16:37

## 2021-02-03 RX ADMIN — Medication 650 MILLIGRAM(S): at 00:19

## 2021-02-03 NOTE — H&P ADULT - NSICDXPASTMEDICALHX_GEN_ALL_CORE_FT
PAST MEDICAL HISTORY:  Diabetes mellitus     DM (diabetes mellitus)     HLD (hyperlipidemia)     HTN (hypertension)     Hyperlipidemia     Hypertension

## 2021-02-03 NOTE — DISCHARGE NOTE PROVIDER - NSDCMRMEDTOKEN_GEN_ALL_CORE_FT
finasteride 5 mg oral tablet: 1 tab(s) orally once a day  gabapentin 300 mg oral capsule: 1  orally once a day  Januvia 100 mg oral tablet: 1 tab(s) orally once a day  lisinopril 10 mg oral tablet: 1 tab(s) orally once a day  metFORMIN 1000 mg oral tablet: 1  orally 2 times a day  simvastatin 40 mg oral tablet: 1 tab(s) orally once a day (at bedtime)  tamsulosin 0.4 mg oral capsule: 1 cap(s) orally once a day   clindamycin 300 mg oral capsule: 1 cap(s) orally every 8 hours   finasteride 5 mg oral tablet: 1 tab(s) orally once a day  gabapentin 300 mg oral capsule: 1  orally once a day  Januvia 100 mg oral tablet: 1 tab(s) orally once a day  lisinopril 10 mg oral tablet: 1 tab(s) orally once a day  metFORMIN 1000 mg oral tablet: 1  orally 2 times a day  simvastatin 40 mg oral tablet: 1 tab(s) orally once a day (at bedtime)  tamsulosin 0.4 mg oral capsule: 1 cap(s) orally once a day

## 2021-02-03 NOTE — DISCHARGE NOTE PROVIDER - HOSPITAL COURSE
74 y/o M with hx of HTN, DM type 2, HLD presents with parotitis. Patient presented to [facility] about 2 days prior with L sided facial swelling and redness on the face. Patient was diagnosed with parotiditis and was sent home on Augmentin. patient states he was unable to  the prescriptions secondary to the snow.  Patient returned to ED because swelling and redness increased. Patient had US done at [place]. US there showed no abscess/fluid collection. Patient was given zosyn and was transferred to [facility] for ENT eval. In ED, he started on IV clindamycin for parotitis. He was also found to be hyponatremic with Na 124, but gradually self-corrected. On 2/3/21, patient was medically optimized for discharge. Patient discharged with PO clindamycin with instructions to follow up with PCP upon discharge. 74 y/o M with hx of HTN, DM type 2, HLD presents with parotitis. Patient presented to [facility] about 2 days prior with L sided facial swelling and redness on the face. Patient was diagnosed with parotiditis and was sent home on Augmentin. patient states he was unable to  the prescriptions secondary to the snow.  Patient returned to ED because swelling and redness increased. Patient had US done at [facility]. US there showed no abscess/fluid collection. Patient was given zosyn and was transferred to [facility] for ENT eval. In ED, he started on IV clindamycin for parotitis. He was also found to be hyponatremic with Na 124, but gradually self-corrected. On 2/3/21, patient was medically optimized for discharge. Patient discharged with PO clindamycin with instructions to follow up with PCP and ENT upon discharge.

## 2021-02-03 NOTE — H&P ADULT - HISTORY OF PRESENT ILLNESS
76 y/o M with hx of HTN, DM type 2, HLD presents with parotitis. Patient presented to [facility] about 2 days ago with L sided facial swelling and redness on the face. Patient was diagnosed with parotiditis and was sent home on Augmentin. patient states he was unable to  the prescriptions secondary to the snow.  Patient returned to ED today because swelling and redness increased. Patient had US done at [place]. US there showed no abscess/fluid collection. Patient was given zosyn and was transferred to [facility] for ENT eval. Denies fever, chills, cough, falls, LOC, drooling, chest pain, nausea, vomiting ,melena, hematochezia, L Eedema or  dysuria .

## 2021-02-03 NOTE — H&P ADULT - PROBLEM SELECTOR PLAN 7
p1.  Name of PCP:  2.  PCP Contacted on Admission: [ ] Y    [ ] N    3.  PCP contacted at Discharge: [ ] Y    [ ] N    [ ] N/A  4.  Post-Discharge Appointment Date and Location:  5.  Summary of Handoff given to PCP:

## 2021-02-03 NOTE — DISCHARGE NOTE PROVIDER - NSDCCPCAREPLAN_GEN_ALL_CORE_FT
PRINCIPAL DISCHARGE DIAGNOSIS  Diagnosis: Parotitis  Assessment and Plan of Treatment: You came to the hospital because you experienced worsening swelling and pain on the left side of your face. You were found to have an inflammation of the parotid gland. There was no abscess or stones visualized on the CT scan. You were evaluated by the ENT doctors who suggested conservative management of your condition. You were started on IV clindamycin. You are being discharged with oral antibiotics to complete at home. Please continue taking the antibiotics as instructed and finish the amount provided. Do not take the medications that were prescribed to you previously from your last ED visit. If you experience worsening swelling, pain, or start having fevers, please come back to the ED. Please schedule an appointment with your primary care doctor within 2 weeks after your discharge from the hospital.      SECONDARY DISCHARGE DIAGNOSES  Diagnosis: Hyponatremia  Assessment and Plan of Treatment: You were found to have low serum sodium levels on admission. You were able to correct the sodium levels with minimal intervention. Please stay hydrated. Follow up with your primary care doctor after discharge. If you start experiencing, dizziness, weakness, fainting, or seizures, please come back to the ED.     PRINCIPAL DISCHARGE DIAGNOSIS  Diagnosis: Parotitis  Assessment and Plan of Treatment: You came to the hospital because you experienced worsening swelling and pain on the left side of your face. You were found to have an inflammation of the parotid gland. There was no abscess or stones visualized on the CT scan. You were evaluated by the ENT doctors who suggested conservative management of your condition. You were started on IV clindamycin. You are being discharged with oral antibiotics to complete at home. Please continue taking the antibiotics as instructed and finish the amount provided. Do not take the medications that were prescribed to you previously from your last ED visit. If you experience worsening swelling, pain, or start having fevers or diarrhea, please come back to the ED. Please schedule an appointment with your primary care doctor within 2 weeks after your discharge from the hospital.      SECONDARY DISCHARGE DIAGNOSES  Diagnosis: Hyponatremia  Assessment and Plan of Treatment: You were found to have low serum sodium levels on admission. You were able to correct the sodium levels with minimal intervention. Please stay hydrated. Follow up with your primary care doctor after discharge. If you start experiencing, dizziness, weakness, fainting, or seizures, please come back to the ED.

## 2021-02-03 NOTE — DISCHARGE NOTE PROVIDER - CARE PROVIDER_API CALL
FRANK DAHL [facility]  Cardiology  [address] [place] [address]  Phone: [phone]  Fax: [phone]  Follow Up Time:

## 2021-02-03 NOTE — DISCHARGE NOTE PROVIDER - NSDCFUADDINST_GEN_ALL_CORE_FT
You are being discharged with a 10 day course of oral clindamycin.  You are being discharged with a 14 day course of oral clindamycin 300mg. Please take 1 tab every 8 hours as instructed. You should take the medication for at least 10 days. If your symptoms resolve by 10 days, you can stop taking the medication.

## 2021-02-03 NOTE — PROGRESS NOTE ADULT - ATTENDING COMMENTS
Pt prefers personal  liat over [place] services  improving swelling and pain  liat ENT, on IV clinda, frequent parotid massage, sour lozenges, warm compress   no collection, Stone on CT, bcx NGTD  switch to po clinda once clinically improved   taurus po   hypovolemic hyponatremia d.t poor po - resolving

## 2021-02-03 NOTE — H&P ADULT - PROBLEM SELECTOR PLAN 2
Monitor Lani yu rapid correction  s/p IVF at [place] ED Monitor Na, corrected of 127  aviod rapid correction  s/p IVF at [place] ED, will repeat bmp

## 2021-02-03 NOTE — H&P ADULT - NSHPLABSRESULTS_GEN_ALL_CORE
EKG:  NSr 96 QTC: 467 Flat T in AVL TWI in AVR, V1                          11.6   14.12 )-----------( 210      ( 02 Feb 2021 17:20 )             34.7     02-02    124<L>  |  91<L>  |  15  ----------------------------<  265<H>  4.1   |  23  |  0.97    Ca    8.7      02 Feb 2021 17:20    TPro  7.3  /  Alb  3.5  /  TBili  0.6  /  DBili  x   /  AST  17  /  ALT  25  /  AlkPhos  70  02-02

## 2021-02-03 NOTE — H&P ADULT - PROBLEM SELECTOR PLAN 1
Admit  ENT consult appreciated  s/p zosyn start clindamycin per ENT  Lozenges PRN  massage the parotid glands Admit  ENT consult appreciated  s/p zosyn, start clindamycin per ENT  Lozenges PRN  massage the parotid glands

## 2021-02-04 ENCOUNTER — TRANSCRIPTION ENCOUNTER (OUTPATIENT)
Age: 76
End: 2021-02-04

## 2021-02-04 VITALS
SYSTOLIC BLOOD PRESSURE: 115 MMHG | OXYGEN SATURATION: 95 % | RESPIRATION RATE: 18 BRPM | WEIGHT: 149.91 LBS | HEART RATE: 69 BPM | DIASTOLIC BLOOD PRESSURE: 73 MMHG | TEMPERATURE: 98 F

## 2021-02-04 LAB
A1C WITH ESTIMATED AVERAGE GLUCOSE RESULT: 7.1 % — HIGH (ref 4–5.6)
ALBUMIN SERPL ELPH-MCNC: 4.1 G/DL — SIGNIFICANT CHANGE UP (ref 3.3–5)
ALP SERPL-CCNC: 77 U/L — SIGNIFICANT CHANGE UP (ref 40–120)
ALT FLD-CCNC: 20 U/L — SIGNIFICANT CHANGE UP (ref 4–41)
ANION GAP SERPL CALC-SCNC: 10 MMOL/L — SIGNIFICANT CHANGE UP (ref 7–14)
AST SERPL-CCNC: 18 U/L — SIGNIFICANT CHANGE UP (ref 4–40)
BILIRUB SERPL-MCNC: 0.4 MG/DL — SIGNIFICANT CHANGE UP (ref 0.2–1.2)
BUN SERPL-MCNC: 18 MG/DL — SIGNIFICANT CHANGE UP (ref 7–23)
CALCIUM SERPL-MCNC: 9.6 MG/DL — SIGNIFICANT CHANGE UP (ref 8.4–10.5)
CHLORIDE SERPL-SCNC: 98 MMOL/L — SIGNIFICANT CHANGE UP (ref 98–107)
CO2 SERPL-SCNC: 23 MMOL/L — SIGNIFICANT CHANGE UP (ref 22–31)
CREAT SERPL-MCNC: 1 MG/DL — SIGNIFICANT CHANGE UP (ref 0.5–1.3)
ESTIMATED AVERAGE GLUCOSE: 157 MG/DL — HIGH (ref 68–114)
GLUCOSE BLDC GLUCOMTR-MCNC: 153 MG/DL — HIGH (ref 70–99)
GLUCOSE BLDC GLUCOMTR-MCNC: 181 MG/DL — HIGH (ref 70–99)
GLUCOSE SERPL-MCNC: 131 MG/DL — HIGH (ref 70–99)
HCT VFR BLD CALC: 39.4 % — SIGNIFICANT CHANGE UP (ref 39–50)
HGB BLD-MCNC: 13.1 G/DL — SIGNIFICANT CHANGE UP (ref 13–17)
MAGNESIUM SERPL-MCNC: 2.3 MG/DL — SIGNIFICANT CHANGE UP (ref 1.6–2.6)
MCHC RBC-ENTMCNC: 30.3 PG — SIGNIFICANT CHANGE UP (ref 27–34)
MCHC RBC-ENTMCNC: 33.2 GM/DL — SIGNIFICANT CHANGE UP (ref 32–36)
MCV RBC AUTO: 91 FL — SIGNIFICANT CHANGE UP (ref 80–100)
NRBC # BLD: 0 /100 WBCS — SIGNIFICANT CHANGE UP
NRBC # FLD: 0 K/UL — SIGNIFICANT CHANGE UP
PHOSPHATE SERPL-MCNC: 3.6 MG/DL — SIGNIFICANT CHANGE UP (ref 2.5–4.5)
PLATELET # BLD AUTO: 274 K/UL — SIGNIFICANT CHANGE UP (ref 150–400)
POTASSIUM SERPL-MCNC: 4.3 MMOL/L — SIGNIFICANT CHANGE UP (ref 3.5–5.3)
POTASSIUM SERPL-SCNC: 4.3 MMOL/L — SIGNIFICANT CHANGE UP (ref 3.5–5.3)
PROT SERPL-MCNC: 7.5 G/DL — SIGNIFICANT CHANGE UP (ref 6–8.3)
RBC # BLD: 4.33 M/UL — SIGNIFICANT CHANGE UP (ref 4.2–5.8)
RBC # FLD: 12.2 % — SIGNIFICANT CHANGE UP (ref 10.3–14.5)
SODIUM SERPL-SCNC: 131 MMOL/L — LOW (ref 135–145)
WBC # BLD: 6.13 K/UL — SIGNIFICANT CHANGE UP (ref 3.8–10.5)
WBC # FLD AUTO: 6.13 K/UL — SIGNIFICANT CHANGE UP (ref 3.8–10.5)

## 2021-02-04 PROCEDURE — 99239 HOSP IP/OBS DSCHRG MGMT >30: CPT

## 2021-02-04 RX ADMIN — GABAPENTIN 300 MILLIGRAM(S): 400 CAPSULE ORAL at 13:18

## 2021-02-04 RX ADMIN — Medication 1: at 09:56

## 2021-02-04 RX ADMIN — Medication 100 MILLIGRAM(S): at 05:44

## 2021-02-04 RX ADMIN — FINASTERIDE 5 MILLIGRAM(S): 5 TABLET, FILM COATED ORAL at 13:19

## 2021-02-04 RX ADMIN — LISINOPRIL 10 MILLIGRAM(S): 2.5 TABLET ORAL at 05:44

## 2021-02-04 RX ADMIN — SODIUM CHLORIDE 3 MILLILITER(S): 9 INJECTION INTRAMUSCULAR; INTRAVENOUS; SUBCUTANEOUS at 05:44

## 2021-02-04 RX ADMIN — Medication 100 MILLIGRAM(S): at 13:18

## 2021-02-04 RX ADMIN — SODIUM CHLORIDE 3 MILLILITER(S): 9 INJECTION INTRAMUSCULAR; INTRAVENOUS; SUBCUTANEOUS at 13:57

## 2021-02-04 RX ADMIN — Medication 1: at 13:17

## 2021-02-04 NOTE — PROGRESS NOTE ADULT - ATTENDING COMMENTS
clinically much improved, resolved leukocytosis, bcx remain ngtd - taurus po  hyponatremia resolving  dc home on po clinda to complete 10 day course with out-pt ENT f.u   Time spent 3 min

## 2021-02-04 NOTE — DISCHARGE NOTE NURSING/CASE MANAGEMENT/SOCIAL WORK - PATIENT PORTAL LINK FT
You can access the FollowMyHealth Patient Portal offered by [facility] by registering at the following website: [email or web address]. By joining [facility]’s FollowMyHealth portal, you will also be able to view your health information using other applications (apps) compatible with our system.

## 2021-02-04 NOTE — PROGRESS NOTE ADULT - PROBLEM SELECTOR PLAN 2
Monitor Na, corrected of 127  aviod rapid correction  s/p IVF at [place] [facility], will repeat bmp
Monitor Na, corrected of 127  aviod rapid correction  s/p IVF at [place] ED, will repeat bmp

## 2021-02-04 NOTE — PROGRESS NOTE ADULT - PROBLEM SELECTOR PLAN 7
p1.  Name of PCP:  2.  PCP Contacted on Admission: [ ] Y    [ ] N    3.  PCP contacted at Discharge: [ ] Y    [ ] N    [ ] N/A  4.  Post-Discharge Appointment Date and Location:  5.  Summary of Handoff given to PCP:
p1.  Name of PCP:  2.  PCP Contacted on Admission: [ ] Y    [ ] N    3.  PCP contacted at Discharge: [ ] Y    [ ] N    [ ] N/A  4.  Post-Discharge Appointment Date and Location:  5.  Summary of Handoff given to PCP:

## 2021-02-04 NOTE — PROGRESS NOTE ADULT - SUBJECTIVE AND OBJECTIVE BOX
PROGRESS NOTE:   Authored by Song Nunez MD  PGY-1, Internal Medicine  Pager [facility] [phone], [phone] [id number]     Patient is a 75y old  Male who presents with a chief complaint of parotiditis (03 Feb 2021 22:22)      SUBJECTIVE / OVERNIGHT EVENTS: No events overnight.    ADDITIONAL REVIEW OF SYSTEMS: Denies fevers, chills, n/v.    MEDICATIONS  (STANDING):  clindamycin IVPB      clindamycin IVPB 600 milliGRAM(s) IV Intermittent every 8 hours  dextrose 40% Gel 15 Gram(s) Oral once  dextrose 5%. 1000 milliLiter(s) (50 mL/Hr) IV Continuous <Continuous>  dextrose 5%. 1000 milliLiter(s) (100 mL/Hr) IV Continuous <Continuous>  dextrose 50% Injectable 25 Gram(s) IV Push once  dextrose 50% Injectable 12.5 Gram(s) IV Push once  dextrose 50% Injectable 25 Gram(s) IV Push once  enoxaparin Injectable 40 milliGRAM(s) SubCutaneous daily  finasteride 5 milliGRAM(s) Oral daily  gabapentin 300 milliGRAM(s) Oral every 24 hours  glucagon  Injectable 1 milliGRAM(s) IntraMuscular once  insulin lispro (ADMELOG) corrective regimen sliding scale   SubCutaneous three times a day before meals  insulin lispro (ADMELOG) corrective regimen sliding scale   SubCutaneous at bedtime  lisinopril 10 milliGRAM(s) Oral daily  simvastatin 40 milliGRAM(s) Oral at bedtime  sodium chloride 0.9% lock flush 3 milliLiter(s) IV Push every 8 hours  tamsulosin 0.4 milliGRAM(s) Oral at bedtime    MEDICATIONS  (PRN):      CAPILLARY BLOOD GLUCOSE      POCT Blood Glucose.: 115 mg/dL (03 Feb 2021 22:30)  POCT Blood Glucose.: 179 mg/dL (03 Feb 2021 15:44)  POCT Blood Glucose.: 127 mg/dL (03 Feb 2021 09:20)    I&O's Summary      PHYSICAL EXAM:  Vital Signs Last 24 Hrs  T(C): 36.6 (04 Feb 2021 05:36), Max: 36.9 (03 Feb 2021 15:30)  T(F): 97.9 (04 Feb 2021 05:36), Max: 98.5 (03 Feb 2021 15:30)  HR: 69 (04 Feb 2021 05:36) (69 - 84)  BP: 115/73 (04 Feb 2021 05:36) (112/73 - 132/76)  BP(mean): --  RR: 18 (04 Feb 2021 05:36) (18 - 18)  SpO2: 95% (04 Feb 2021 05:36) (95% - 99%)    CONSTITUTIONAL: NAD, lying in bed comfortably  RESPIRATORY: Normal respiratory effort; CTABL  CARDIOVASCULAR: Regular rate and rhythm, normal S1 and S2, no murmur/rub/gallop  ABDOMEN: Soft, nondistended, nontender to palpation, normoactive bowel sounds, no rebound/guarding  MUSCLOSKELETAL: no joint swelling or tenderness to palpation, FROM all extremities  NEURO: AAOx3 to person, place, and time, full and equal strength all extremities   EXTREMITIES: no pedal edema    LABS:                        11.9   9.28  )-----------( 239      ( 03 Feb 2021 06:32 )             36.7     02-03    131<L>  |  99  |  11  ----------------------------<  121<H>  4.2   |  21<L>  |  0.95    Ca    9.2      03 Feb 2021 06:32  Phos  3.1     02-03  Mg     2.2     02-03    TPro  7.3  /  Alb  3.5  /  TBili  0.6  /  DBili  x   /  AST  17  /  ALT  25  /  AlkPhos  70  02-02    PT/INR - ( 02 Feb 2021 17:20 )   PT: 12.4 sec;   INR: 1.03 ratio         PTT - ( 02 Feb 2021 17:20 )  PTT:29.9 sec          Culture - Blood (collected 02 Feb 2021 22:15)  Source: .Blood Blood-Peripheral  Preliminary Report (03 Feb 2021 23:02):    No growth to date.    Culture - Blood (collected 02 Feb 2021 22:15)  Source: .Blood Blood-Peripheral  Preliminary Report (03 Feb 2021 23:02):    No growth to date.        RADIOLOGY & ADDITIONAL TESTS:    
PROGRESS NOTE:   Authored by Song Nunez MD  PGY-1, Internal Medicine  Pager [facility] [phone], J [id number]     Patient is a 75y old  Male who presents with a chief complaint of parotiditis (03 Feb 2021 04:10)      SUBJECTIVE / OVERNIGHT EVENTS: No events overnight.    ADDITIONAL REVIEW OF SYSTEMS: Denies fevers, chills, n/v.    MEDICATIONS  (STANDING):  clindamycin IVPB      clindamycin IVPB 600 milliGRAM(s) IV Intermittent every 8 hours  dextrose 40% Gel 15 Gram(s) Oral once  dextrose 5%. 1000 milliLiter(s) (50 mL/Hr) IV Continuous <Continuous>  dextrose 5%. 1000 milliLiter(s) (100 mL/Hr) IV Continuous <Continuous>  dextrose 50% Injectable 25 Gram(s) IV Push once  dextrose 50% Injectable 12.5 Gram(s) IV Push once  dextrose 50% Injectable 25 Gram(s) IV Push once  enoxaparin Injectable 40 milliGRAM(s) SubCutaneous daily  finasteride 5 milliGRAM(s) Oral daily  gabapentin 300 milliGRAM(s) Oral every 24 hours  glucagon  Injectable 1 milliGRAM(s) IntraMuscular once  insulin lispro (ADMELOG) corrective regimen sliding scale   SubCutaneous three times a day before meals  insulin lispro (ADMELOG) corrective regimen sliding scale   SubCutaneous at bedtime  lisinopril 10 milliGRAM(s) Oral daily  simvastatin 40 milliGRAM(s) Oral at bedtime  sodium chloride 0.9% lock flush 3 milliLiter(s) IV Push every 8 hours  tamsulosin 0.4 milliGRAM(s) Oral at bedtime    MEDICATIONS  (PRN):      CAPILLARY BLOOD GLUCOSE      POCT Blood Glucose.: 139 mg/dL (02 Feb 2021 21:38)    I&O's Summary      PHYSICAL EXAM:  Vital Signs Last 24 Hrs  T(C): 36.7 (03 Feb 2021 05:42), Max: 37.2 (02 Feb 2021 18:59)  T(F): 98 (03 Feb 2021 05:42), Max: 99 (02 Feb 2021 23:15)  HR: 68 (03 Feb 2021 05:42) (68 - 104)  BP: 126/77 (03 Feb 2021 05:42) (110/78 - 154/93)  BP(mean): --  RR: 16 (03 Feb 2021 05:42) (14 - 17)  SpO2: 96% (03 Feb 2021 05:42) (95% - 99%)    CONSTITUTIONAL: NAD, lying in bed comfortably  RESPIRATORY: Normal respiratory effort; CTABL  CARDIOVASCULAR: Regular rate and rhythm, normal S1 and S2, no murmur/rub/gallop  ABDOMEN: Soft, nondistended, nontender to palpation, normoactive bowel sounds, no rebound/guarding  MUSCLOSKELETAL: no joint swelling or tenderness to palpation, FROM all extremities  NEURO: AAOx3 to person, place, and time, full and equal strength all extremities   EXTREMITIES: no pedal edema    LABS:                        11.9   9.28  )-----------( 239      ( 03 Feb 2021 06:32 )             36.7     02-03    131<L>  |  99  |  11  ----------------------------<  121<H>  4.2   |  21<L>  |  0.95    Ca    9.2      03 Feb 2021 06:32  Phos  3.1     02-03  Mg     2.2     02-03    TPro  7.3  /  Alb  3.5  /  TBili  0.6  /  DBili  x   /  AST  17  /  ALT  25  /  AlkPhos  70  02-02    PT/INR - ( 02 Feb 2021 17:20 )   PT: 12.4 sec;   INR: 1.03 ratio         PTT - ( 02 Feb 2021 17:20 )  PTT:29.9 sec          Culture - Blood (collected 31 Jan 2021 21:07)  Source: .Blood Blood  Preliminary Report (01 Feb 2021 22:02):    No growth to date.    Culture - Blood (collected 31 Jan 2021 21:07)  Source: .Blood Blood  Preliminary Report (01 Feb 2021 22:02):    No growth to date.        RADIOLOGY & ADDITIONAL TESTS:

## 2021-02-04 NOTE — PROGRESS NOTE ADULT - PROBLEM SELECTOR PLAN 1
Admit  ENT consult appreciated  s/p zosyn, start clindamycin per ENT  Lozenges PRN  massage the parotid glands
Admit  ENT consult appreciated  s/p zosyn, start clindamycin per ENT  Lozenges PRN  massage the parotid glands

## 2021-02-05 LAB
CULTURE RESULTS: SIGNIFICANT CHANGE UP
CULTURE RESULTS: SIGNIFICANT CHANGE UP
SPECIMEN SOURCE: SIGNIFICANT CHANGE UP
SPECIMEN SOURCE: SIGNIFICANT CHANGE UP

## 2022-04-07 NOTE — ED ADULT NURSE NOTE - CAS TRG GEN SKIN CONDITION
Chief Complaint   Eye Problem (hx of allergies, no pain or itching)      History of Present Illness   Source of history provided by: patient. Tori Wagner is a 45 y.o. old male presenting to the walk in clinic for evaluation of swelling and mild itching of the right upper eyelid since this morning. Pt states he was working outdoors prior to the start of his symptoms and suspected seasonal allergies are to blame. Denies any redness, pain, or abnormal drainage to the right. States there was no obvious FB exposure. Has not had a recent URI within the past week. Denies any visual changes, visual loss, HA, ear pain, fever, chills, N/V, or lethargy. Denies any contact with any individuals with known COVID-19 infection or under investigation for COVID-19 infection. Patient states that his seasonal allergies are typically controlled with daily Claritin and Flonase nasal spray. Past medical history is significant for type 2 diabetes. Circumstances:    []  Contact Lens Use     []  Recent URI Sx's     [x]  Spontaneous Onset     []  Close Contact w/similar Sx's     []  Work Related     History of:     []   Glaucoma     []   Recent Eye Surgery     ROS    Unless otherwise stated in this report or unable to obtain because of the patient's clinical or mental status as evidenced by the medical record, this patients's positive and negative responses for Review of Systems, constitutional, psych, eyes, ENT, cardiovascular, respiratory, gastrointestinal, neurological, genitourinary, musculoskeletal, integument systems and systems related to the presenting problem are either stated in the preceding or were not pertinent or were negative for the symptoms and/or complaints related to the medical problem. Past Medical History:  has no past medical history on file. Past Surgical History:  has no past surgical history on file. Social History:  reports that he has never smoked.  He has never used smokeless tobacco.  Family History: family history is not on file. Allergies: Penicillin g    Physical Exam         VS:  /74   Pulse 94   Temp 97.8 °F (36.6 °C) (Temporal)   Resp 20   Ht 6' (1.829 m)   Wt (!) 320 lb (145.2 kg)   SpO2 97%   BMI 43.40 kg/m²    Oxygen Saturation Interpretation: Normal.    Constitutional:  Alert, development consistent with age. HENT:  NC/NT. Airway patent. Eyes:         Pupils: PERRL bilaterally. Eyelids: Moderate edema noted to the right upper lid. Conjunctiva: Mild conjunctival injection on the right. Sclera: No scleral injection on the right. No obvious FB, rust ring, or hyphema. Cornea: No obvious corneal abrasion or ulceration bilaterally. EOM:  Intact bilaterally. Visual Acuity:  Grossly intact. Lungs: CTAB without wheezing, rales, or rhonchi  Heart: RRR, no murmurs, rubs, or gallops. Skin: Moist and warm without rashes or lesions. Lymphatics: No lymphangitis or adenopathy noted. Neurological:  Alert and oriented. Motor functions intact. Lab / Imaging Results   (All laboratory and radiology results have been personally reviewed by myself)    Imaging: All Radiology results interpreted by Radiologist unless otherwise noted. No orders to display         Assessment / Plan     Impression(s):  Krystian De La Torre was seen today for eye problem. Diagnoses and all orders for this visit:    Allergic conjunctivitis of right eye  -     triamcinolone acetonide (KENALOG-40) injection 40 mg      Disposition:  Disposition: Discharge to home. Symptoms are likely a result of poorly controlled seasonal allergies. Kenalog injection administered in office today, potential reactions discussed. Patient advised to carefully monitor blood glucose at home after receiving steroid. ER immediately with any readings greater than 350 or signs/symptoms of hyperglycemia. F/u with ophthalmology in 48 hrs if not improved.  ED sooner if symptoms worsen or change. ED immediately with the development of fever, visual changes, ocular pain/swelling, body aches, or shaking chills. Pt is in agreement with this care plan. All questions answered. Zahra Arambula PA-C    **This report was transcribed using voice recognition software. Every effort was made to ensure accuracy; however, inadvertent computerized transcription errors may be present. Warm

## 2022-10-10 NOTE — ED PROVIDER NOTE - PMH
Diabetes mellitus    DM (diabetes mellitus)    HLD (hyperlipidemia)    HTN (hypertension)    Hyperlipidemia    Hypertension     [address]  Progress Note - Nikia Mendezks 1994, 33 y o  male MRN: [id number]  Unit/Bed#: [address] [address] Encounter: [id number]  Primary Care Provider: Debra Peter DO   Date and time admitted to hospital: 10/9/2022  3:52 PM    * Abdominal pain  Assessment & Plan  Patient presents to ER with abdominal pain, ED discussed with surgeon and accepted for transfer to [address] [place] [address] - accepting doc Jules Cristobal  · No bed availabiity at [facility], patient will stay here until bed available  · Pt discharged home 10/7 on flagyl, steroids, was to f/u with IBD specialist/[facility] about starting  therapy  · CT a/p: Stable mild thickening of the distal small bowel/neorectum with adjacent fluid  · Continue flagyl  · Continue prednisone 40mg, taper down by 5mg weekly  · Pain medication prn  · Clear liquids --> advanced to full liquids  · Received IVF    SIRS (systemic inflammatory response syndrome) (HCC)  Assessment & Plan  POA as evidenced by tachycardia, WBC 18 11  · Leukocytosis most likely due to steroid use and is trending down  · Tachycardia secondary to pain/dehydration  · No signs of infection    Ileal pouchitis ([address] )  Assessment & Plan  S/p pouchoscopy on 10/4 which was negative for stricture or inflammation  · Continue prednisone as noted above  Continue flagyl    Ankylosing spondylitis [facility])  Assessment & Plan  Not currently on biologic therapy  CAR (generalized anxiety disorder)  Assessment & Plan  Continue Cymbalta 60mg daily      VTE Pharmacologic Prophylaxis: VTE Score: 3 Moderate Risk (Score 3-4) - Pharmacological DVT Prophylaxis Ordered: enoxaparin (Lovenox)  Patient Centered Rounds: I performed bedside rounds with nursing staff today  Discussions with Specialists or Other Care Team Provider: yes - Milton Wong PA-C at [facility] and Discussions with Family / Patient: yes    Time Spent for Care: 30 minutes   More than 50% of total time spent on counseling and coordination of care as described above  Current Length of Stay: 0 day(s)  Current Patient Status: Observation   Certification Statement: The patient will continue to require additional inpatient hospital stay due to Abdominal pain requiring transferred to [facility] for further evaluation/management  Discharge Plan: Discharge once bed available    Code Status: Level 1 - Full Code    Subjective:     Patient reports some improvement in abdominal pain today  Tolerating clears and requesting diet advancement    Objective:     Vitals:   Temp (24hrs), Av 9 °F (36 6 °C), Min:97 7 °F (36 5 °C), Max:98 1 °F (36 7 °C)    Temp:  [97 7 °F (36 5 °C)-98 1 °F (36 7 °C)] 98 1 °F (36 7 °C)  HR:  [] 86  Resp:  [9-19] 18  BP: (112-149)/(69-87) 149/77  SpO2:  [93 %-99 %] 93 %  Body mass index is 26 88 kg/m²  Input and Output Summary (last 24 hours):   No intake or output data in the 24 hours ending 10/10/22 346    Physical Exam:   Physical Exam  Vitals reviewed  Constitutional:       General: He is not in acute distress  Appearance: He is not ill-appearing  HENT:      Head: Normocephalic and atraumatic  Eyes:      General:         Right eye: No discharge  Left eye: No discharge  Extraocular Movements: Extraocular movements intact  Cardiovascular:      Rate and Rhythm: Normal rate and regular rhythm  Pulmonary:      Effort: Pulmonary effort is normal  No respiratory distress  Breath sounds: Normal breath sounds  No wheezing or rales  Abdominal:      General: Bowel sounds are normal  There is no distension  Palpations: Abdomen is soft  Tenderness: There is abdominal tenderness (Epigastric)  There is no guarding  Neurological:      Mental Status: He is alert and oriented to person, place, and time           Additional Data:     Labs:  Results from last 7 days   Lab Units 10/10/22  0520   WBC Thousand/uL 12 39*   HEMOGLOBIN g/dL 9 9*   HEMATOCRIT % 32 8*   PLATELETS Thousands/uL 355   NEUTROS PCT % 71   LYMPHS PCT % 17   MONOS PCT % 9   EOS PCT % 2     Results from last 7 days   Lab Units 10/10/22  0520 10/09/22  1614   SODIUM mmol/L 138 139   POTASSIUM mmol/L 3 6 3 1*   CHLORIDE mmol/L 103 101   CO2 mmol/L 30 30   BUN mg/dL 13 19   CREATININE mg/dL 0 86 1 09   ANION GAP mmol/L 5 8   CALCIUM mg/dL 8 3 8 4   ALBUMIN g/dL  --  3 3*   TOTAL BILIRUBIN mg/dL  --  0 89   ALK PHOS U/L  --  63   ALT U/L  --  82*   AST U/L  --  34   GLUCOSE RANDOM mg/dL 84 155*                       Lines/Drains:  Invasive Devices  Report    None                 Imaging: Reviewed radiology reports from this admission including: abdominal/pelvic CT    Recent Cultures (last 7 days):         Last 24 Hours Medication List:   Current Facility-Administered Medications   Medication Dose Route Frequency Provider Last Rate   • acetaminophen  650 mg Oral Q6H PRN Pat Zhu PA-C     • DULoxetine  60 mg Oral Daily Emmanuelle Dodge PA-C     • enoxaparin  40 mg Subcutaneous Daily Emmanuelle Dodge PA-C     • HYDROmorphone  0 5 mg Intravenous Q3H PRN Emmanuelle Dodge PA-C     • metroNIDAZOLE  500 mg Oral Q8H [address] Emmanuelle Dodge PA-C     • ondansetron  4 mg Intravenous Q6H PRN Pat Zhu PA-C     • oxyCODONE-acetaminophen  1 tablet Oral Q4H PRN Emmanuelle Dodge PA-C     • pantoprazole  40 mg Oral BID AC Emmanuelle Dodge PA-C     • [START ON 11/18/2022] predniSONE  10 mg Oral Daily Emmanuelle Dodge PA-C     • [START ON 11/11/2022] predniSONE  15 mg Oral Daily Emmanuelle Dodge PA-C     • [START ON 11/4/2022] predniSONE  20 mg Oral Daily Emmanuelle Dodge PA-C     • [START ON 10/28/2022] predniSONE  25 mg Oral Daily Emmanuelle Dodge PA-C     • [START ON 10/21/2022] predniSONE  30 mg Oral Daily Emmanuelle Dodge PA-C     • [START ON 10/14/2022] predniSONE  35 mg Oral Daily Emmanuelle Dodge PA-C     • predniSONE  40 mg Oral Daily Emmanuelle Dodge PA-C     • [START ON 11/25/2022] predniSONE  5 mg Oral Daily Pat Zhu SELENA     • saccharomyces boulardii  250 mg Oral BID Maximilian Vidal PA-C          Today, Patient Was Seen By: Sofía Kraus    **Please Note: This note may have been constructed using a voice recognition system  **

## 2022-11-18 NOTE — ED ADULT NURSE NOTE - NS ED NURSE RECORDS SENT
"Laila Bailey is a 7 y.o. female here for a non-provider visit for:   COVID 3 of 3  FLU    Reason for immunization: continue or complete series started at the office  Immunization records indicate need for vaccine: Yes, confirmed with Epic and confirmed with [place] [employer]  Minimum interval has been met for this vaccine: Yes  ABN completed: Not Indicated    VIS Dated  8-6-21 was given to patient: Yes  All IAC Questionnaire questions were answered \"No.\"    Patient tolerated injection and no adverse effects were observed or reported: Yes    Pt scheduled for next dose in series: Not Indicated  "
Medical Records sent

## 2022-11-26 NOTE — H&P ADULT - NECK DETAILS
Aspen Braun was seen and treated in our emergency department on 11/26/2022. She may return to work on 11/28/2022. If you have any questions or concerns, please don't hesitate to call.       Judith Peoples PA-C supple/no JVD

## 2022-12-18 ENCOUNTER — TRANSCRIPTION ENCOUNTER (OUTPATIENT)
Age: 77
End: 2022-12-18

## 2022-12-19 ENCOUNTER — INPATIENT (INPATIENT)
Facility: HOSPITAL | Age: 77
LOS: 1 days | Discharge: HOME | DRG: 264 | End: 2022-12-21
Attending: INTERNAL MEDICINE | Admitting: INTERNAL MEDICINE
Payer: MEDICARE

## 2022-12-19 VITALS
SYSTOLIC BLOOD PRESSURE: 92 MMHG | WEIGHT: 169.98 LBS | TEMPERATURE: 98 F | RESPIRATION RATE: 18 BRPM | HEART RATE: 86 BPM | HEIGHT: 66 IN | OXYGEN SATURATION: 98 % | DIASTOLIC BLOOD PRESSURE: 53 MMHG

## 2022-12-19 DIAGNOSIS — E78.5 HYPERLIPIDEMIA, UNSPECIFIED: ICD-10-CM

## 2022-12-19 DIAGNOSIS — E11.40 TYPE 2 DIABETES MELLITUS WITH DIABETIC NEUROPATHY, UNSPECIFIED: ICD-10-CM

## 2022-12-19 DIAGNOSIS — R55 SYNCOPE AND COLLAPSE: ICD-10-CM

## 2022-12-19 DIAGNOSIS — S02.2XXA FRACTURE OF NASAL BONES, INITIAL ENCOUNTER FOR CLOSED FRACTURE: ICD-10-CM

## 2022-12-19 LAB
ALBUMIN SERPL ELPH-MCNC: 3.7 G/DL — SIGNIFICANT CHANGE UP (ref 3.3–5)
ALP SERPL-CCNC: 124 U/L — HIGH (ref 30–120)
ALT FLD-CCNC: 21 U/L DA — SIGNIFICANT CHANGE UP (ref 10–60)
ANION GAP SERPL CALC-SCNC: 7 MMOL/L — SIGNIFICANT CHANGE UP (ref 5–17)
APPEARANCE UR: CLEAR — SIGNIFICANT CHANGE UP
APTT BLD: 28.1 SEC — SIGNIFICANT CHANGE UP (ref 27.5–35.5)
AST SERPL-CCNC: 31 U/L — SIGNIFICANT CHANGE UP (ref 10–40)
BACTERIA # UR AUTO: NEGATIVE — SIGNIFICANT CHANGE UP
BASOPHILS # BLD AUTO: 0.05 K/UL — SIGNIFICANT CHANGE UP (ref 0–0.2)
BASOPHILS NFR BLD AUTO: 0.3 % — SIGNIFICANT CHANGE UP (ref 0–2)
BILIRUB SERPL-MCNC: 0.6 MG/DL — SIGNIFICANT CHANGE UP (ref 0.2–1.2)
BILIRUB UR-MCNC: NEGATIVE — SIGNIFICANT CHANGE UP
BUN SERPL-MCNC: 17 MG/DL — SIGNIFICANT CHANGE UP (ref 7–23)
CALCIUM SERPL-MCNC: 8.8 MG/DL — SIGNIFICANT CHANGE UP (ref 8.4–10.5)
CHLORIDE SERPL-SCNC: 102 MMOL/L — SIGNIFICANT CHANGE UP (ref 96–108)
CO2 SERPL-SCNC: 26 MMOL/L — SIGNIFICANT CHANGE UP (ref 22–31)
COLOR SPEC: YELLOW — SIGNIFICANT CHANGE UP
CREAT SERPL-MCNC: 1.27 MG/DL — SIGNIFICANT CHANGE UP (ref 0.5–1.3)
DIFF PNL FLD: ABNORMAL
EGFR: 58 ML/MIN/1.73M2 — LOW
EOSINOPHIL # BLD AUTO: 0.27 K/UL — SIGNIFICANT CHANGE UP (ref 0–0.5)
EOSINOPHIL NFR BLD AUTO: 1.8 % — SIGNIFICANT CHANGE UP (ref 0–6)
EPI CELLS # UR: NEGATIVE — SIGNIFICANT CHANGE UP
FLUAV AG NPH QL: SIGNIFICANT CHANGE UP
FLUBV AG NPH QL: SIGNIFICANT CHANGE UP
GLUCOSE BLDC GLUCOMTR-MCNC: 149 MG/DL — HIGH (ref 70–99)
GLUCOSE SERPL-MCNC: 149 MG/DL — HIGH (ref 70–99)
GLUCOSE UR QL: 50 MG/DL
HCT VFR BLD CALC: 40.1 % — SIGNIFICANT CHANGE UP (ref 39–50)
HGB BLD-MCNC: 13.3 G/DL — SIGNIFICANT CHANGE UP (ref 13–17)
IMM GRANULOCYTES NFR BLD AUTO: 0.4 % — SIGNIFICANT CHANGE UP (ref 0–0.9)
INR BLD: 1 RATIO — SIGNIFICANT CHANGE UP (ref 0.88–1.16)
KETONES UR-MCNC: NEGATIVE — SIGNIFICANT CHANGE UP
LEUKOCYTE ESTERASE UR-ACNC: NEGATIVE — SIGNIFICANT CHANGE UP
LYMPHOCYTES # BLD AUTO: 1.68 K/UL — SIGNIFICANT CHANGE UP (ref 1–3.3)
LYMPHOCYTES # BLD AUTO: 10.9 % — LOW (ref 13–44)
MCHC RBC-ENTMCNC: 30.1 PG — SIGNIFICANT CHANGE UP (ref 27–34)
MCHC RBC-ENTMCNC: 33.2 GM/DL — SIGNIFICANT CHANGE UP (ref 32–36)
MCV RBC AUTO: 90.7 FL — SIGNIFICANT CHANGE UP (ref 80–100)
MONOCYTES # BLD AUTO: 0.99 K/UL — HIGH (ref 0–0.9)
MONOCYTES NFR BLD AUTO: 6.4 % — SIGNIFICANT CHANGE UP (ref 2–14)
NEUTROPHILS # BLD AUTO: 12.35 K/UL — HIGH (ref 1.8–7.4)
NEUTROPHILS NFR BLD AUTO: 80.2 % — HIGH (ref 43–77)
NITRITE UR-MCNC: NEGATIVE — SIGNIFICANT CHANGE UP
NRBC # BLD: 0 /100 WBCS — SIGNIFICANT CHANGE UP (ref 0–0)
PH UR: 6 — SIGNIFICANT CHANGE UP (ref 5–8)
PLATELET # BLD AUTO: 239 K/UL — SIGNIFICANT CHANGE UP (ref 150–400)
POTASSIUM SERPL-MCNC: 4.7 MMOL/L — SIGNIFICANT CHANGE UP (ref 3.5–5.3)
POTASSIUM SERPL-SCNC: 4.7 MMOL/L — SIGNIFICANT CHANGE UP (ref 3.5–5.3)
PROT SERPL-MCNC: 7.6 G/DL — SIGNIFICANT CHANGE UP (ref 6–8.3)
PROT UR-MCNC: 15 MG/DL
PROTHROM AB SERPL-ACNC: 11.8 SEC — SIGNIFICANT CHANGE UP (ref 10.5–13.4)
RBC # BLD: 4.42 M/UL — SIGNIFICANT CHANGE UP (ref 4.2–5.8)
RBC # FLD: 12.3 % — SIGNIFICANT CHANGE UP (ref 10.3–14.5)
RBC CASTS # UR COMP ASSIST: SIGNIFICANT CHANGE UP /HPF (ref 0–4)
RSV RNA NPH QL NAA+NON-PROBE: SIGNIFICANT CHANGE UP
SARS-COV-2 RNA SPEC QL NAA+PROBE: SIGNIFICANT CHANGE UP
SODIUM SERPL-SCNC: 135 MMOL/L — SIGNIFICANT CHANGE UP (ref 135–145)
SP GR SPEC: 1.01 — SIGNIFICANT CHANGE UP (ref 1.01–1.02)
TROPONIN I, HIGH SENSITIVITY RESULT: 5.4 NG/L — SIGNIFICANT CHANGE UP
UROBILINOGEN FLD QL: NEGATIVE MG/DL — SIGNIFICANT CHANGE UP
WBC # BLD: 15.4 K/UL — HIGH (ref 3.8–10.5)
WBC # FLD AUTO: 15.4 K/UL — HIGH (ref 3.8–10.5)
WBC UR QL: SIGNIFICANT CHANGE UP

## 2022-12-19 PROCEDURE — 71045 X-RAY EXAM CHEST 1 VIEW: CPT | Mod: 26

## 2022-12-19 PROCEDURE — 73110 X-RAY EXAM OF WRIST: CPT | Mod: 26,LT

## 2022-12-19 PROCEDURE — 70450 CT HEAD/BRAIN W/O DYE: CPT | Mod: 26,MA

## 2022-12-19 PROCEDURE — 72125 CT NECK SPINE W/O DYE: CPT | Mod: 26,MA

## 2022-12-19 PROCEDURE — 73080 X-RAY EXAM OF ELBOW: CPT | Mod: 26,LT

## 2022-12-19 PROCEDURE — 99285 EMERGENCY DEPT VISIT HI MDM: CPT

## 2022-12-19 PROCEDURE — 93010 ELECTROCARDIOGRAM REPORT: CPT

## 2022-12-19 PROCEDURE — 70486 CT MAXILLOFACIAL W/O DYE: CPT | Mod: 26,MA

## 2022-12-19 RX ORDER — DEXTROSE 50 % IN WATER 50 %
12.5 SYRINGE (ML) INTRAVENOUS ONCE
Refills: 0 | Status: DISCONTINUED | OUTPATIENT
Start: 2022-12-19 | End: 2022-12-21

## 2022-12-19 RX ORDER — DEXTROSE 50 % IN WATER 50 %
15 SYRINGE (ML) INTRAVENOUS ONCE
Refills: 0 | Status: DISCONTINUED | OUTPATIENT
Start: 2022-12-19 | End: 2022-12-21

## 2022-12-19 RX ORDER — SIMVASTATIN 20 MG/1
40 TABLET, FILM COATED ORAL AT BEDTIME
Refills: 0 | Status: DISCONTINUED | OUTPATIENT
Start: 2022-12-19 | End: 2022-12-21

## 2022-12-19 RX ORDER — INSULIN LISPRO 100/ML
VIAL (ML) SUBCUTANEOUS AT BEDTIME
Refills: 0 | Status: DISCONTINUED | OUTPATIENT
Start: 2022-12-19 | End: 2022-12-21

## 2022-12-19 RX ORDER — LACTOBACILLUS ACIDOPHILUS 100MM CELL
1 CAPSULE ORAL
Refills: 0 | Status: DISCONTINUED | OUTPATIENT
Start: 2022-12-19 | End: 2022-12-21

## 2022-12-19 RX ORDER — SODIUM CHLORIDE 9 MG/ML
1000 INJECTION, SOLUTION INTRAVENOUS
Refills: 0 | Status: DISCONTINUED | OUTPATIENT
Start: 2022-12-19 | End: 2022-12-21

## 2022-12-19 RX ORDER — GLUCAGON INJECTION, SOLUTION 0.5 MG/.1ML
1 INJECTION, SOLUTION SUBCUTANEOUS ONCE
Refills: 0 | Status: DISCONTINUED | OUTPATIENT
Start: 2022-12-19 | End: 2022-12-21

## 2022-12-19 RX ORDER — FINASTERIDE 5 MG/1
5 TABLET, FILM COATED ORAL DAILY
Refills: 0 | Status: DISCONTINUED | OUTPATIENT
Start: 2022-12-19 | End: 2022-12-21

## 2022-12-19 RX ORDER — TAMSULOSIN HYDROCHLORIDE 0.4 MG/1
0.4 CAPSULE ORAL AT BEDTIME
Refills: 0 | Status: DISCONTINUED | OUTPATIENT
Start: 2022-12-19 | End: 2022-12-21

## 2022-12-19 RX ORDER — OXYCODONE AND ACETAMINOPHEN 5; 325 MG/1; MG/1
1 TABLET ORAL EVERY 6 HOURS
Refills: 0 | Status: DISCONTINUED | OUTPATIENT
Start: 2022-12-19 | End: 2022-12-21

## 2022-12-19 RX ORDER — ENOXAPARIN SODIUM 100 MG/ML
40 INJECTION SUBCUTANEOUS EVERY 24 HOURS
Refills: 0 | Status: DISCONTINUED | OUTPATIENT
Start: 2022-12-20 | End: 2022-12-21

## 2022-12-19 RX ORDER — SODIUM CHLORIDE 9 MG/ML
1000 INJECTION INTRAMUSCULAR; INTRAVENOUS; SUBCUTANEOUS ONCE
Refills: 0 | Status: COMPLETED | OUTPATIENT
Start: 2022-12-19 | End: 2022-12-19

## 2022-12-19 RX ORDER — ACETAMINOPHEN 500 MG
1000 TABLET ORAL ONCE
Refills: 0 | Status: COMPLETED | OUTPATIENT
Start: 2022-12-19 | End: 2022-12-19

## 2022-12-19 RX ORDER — MECLIZINE HCL 12.5 MG
25 TABLET ORAL ONCE
Refills: 0 | Status: COMPLETED | OUTPATIENT
Start: 2022-12-19 | End: 2022-12-19

## 2022-12-19 RX ORDER — GABAPENTIN 400 MG/1
300 CAPSULE ORAL
Refills: 0 | Status: DISCONTINUED | OUTPATIENT
Start: 2022-12-19 | End: 2022-12-21

## 2022-12-19 RX ORDER — TETANUS TOXOID, REDUCED DIPHTHERIA TOXOID AND ACELLULAR PERTUSSIS VACCINE, ADSORBED 5; 2.5; 8; 8; 2.5 [IU]/.5ML; [IU]/.5ML; UG/.5ML; UG/.5ML; UG/.5ML
0.5 SUSPENSION INTRAMUSCULAR ONCE
Refills: 0 | Status: COMPLETED | OUTPATIENT
Start: 2022-12-19 | End: 2022-12-19

## 2022-12-19 RX ORDER — DEXTROSE 50 % IN WATER 50 %
25 SYRINGE (ML) INTRAVENOUS ONCE
Refills: 0 | Status: DISCONTINUED | OUTPATIENT
Start: 2022-12-19 | End: 2022-12-21

## 2022-12-19 RX ORDER — METFORMIN HYDROCHLORIDE 850 MG/1
1000 TABLET ORAL
Refills: 0 | Status: DISCONTINUED | OUTPATIENT
Start: 2022-12-19 | End: 2022-12-21

## 2022-12-19 RX ORDER — ACETAMINOPHEN 500 MG
650 TABLET ORAL EVERY 6 HOURS
Refills: 0 | Status: DISCONTINUED | OUTPATIENT
Start: 2022-12-19 | End: 2022-12-21

## 2022-12-19 RX ORDER — INSULIN LISPRO 100/ML
VIAL (ML) SUBCUTANEOUS
Refills: 0 | Status: DISCONTINUED | OUTPATIENT
Start: 2022-12-19 | End: 2022-12-21

## 2022-12-19 RX ADMIN — SIMVASTATIN 40 MILLIGRAM(S): 20 TABLET, FILM COATED ORAL at 22:31

## 2022-12-19 RX ADMIN — FINASTERIDE 5 MILLIGRAM(S): 5 TABLET, FILM COATED ORAL at 23:22

## 2022-12-19 RX ADMIN — SODIUM CHLORIDE 1000 MILLILITER(S): 9 INJECTION INTRAMUSCULAR; INTRAVENOUS; SUBCUTANEOUS at 16:29

## 2022-12-19 RX ADMIN — Medication 400 MILLIGRAM(S): at 08:54

## 2022-12-19 RX ADMIN — SODIUM CHLORIDE 1000 MILLILITER(S): 9 INJECTION INTRAMUSCULAR; INTRAVENOUS; SUBCUTANEOUS at 08:54

## 2022-12-19 RX ADMIN — SODIUM CHLORIDE 1000 MILLILITER(S): 9 INJECTION INTRAMUSCULAR; INTRAVENOUS; SUBCUTANEOUS at 16:10

## 2022-12-19 RX ADMIN — SODIUM CHLORIDE 1000 MILLILITER(S): 9 INJECTION INTRAMUSCULAR; INTRAVENOUS; SUBCUTANEOUS at 18:10

## 2022-12-19 RX ADMIN — TAMSULOSIN HYDROCHLORIDE 0.4 MILLIGRAM(S): 0.4 CAPSULE ORAL at 23:22

## 2022-12-19 RX ADMIN — Medication 1000 MILLIGRAM(S): at 09:46

## 2022-12-19 RX ADMIN — Medication 650 MILLIGRAM(S): at 23:15

## 2022-12-19 RX ADMIN — Medication 1000 MILLIGRAM(S): at 09:10

## 2022-12-19 RX ADMIN — SODIUM CHLORIDE 1000 MILLILITER(S): 9 INJECTION INTRAMUSCULAR; INTRAVENOUS; SUBCUTANEOUS at 17:34

## 2022-12-19 RX ADMIN — Medication 650 MILLIGRAM(S): at 22:31

## 2022-12-19 RX ADMIN — GABAPENTIN 300 MILLIGRAM(S): 400 CAPSULE ORAL at 23:22

## 2022-12-19 RX ADMIN — SODIUM CHLORIDE 1000 MILLILITER(S): 9 INJECTION INTRAMUSCULAR; INTRAVENOUS; SUBCUTANEOUS at 09:48

## 2022-12-19 RX ADMIN — Medication 25 MILLIGRAM(S): at 16:10

## 2022-12-19 RX ADMIN — TETANUS TOXOID, REDUCED DIPHTHERIA TOXOID AND ACELLULAR PERTUSSIS VACCINE, ADSORBED 0.5 MILLILITER(S): 5; 2.5; 8; 8; 2.5 SUSPENSION INTRAMUSCULAR at 08:54

## 2022-12-19 RX ADMIN — Medication 1 TABLET(S): at 12:00

## 2022-12-19 NOTE — ED PROVIDER NOTE - DIFFERENTIAL DIAGNOSIS
Differential including but not limited to arrhythmia MI electrolyte abnormality anemia intracranial hemorrhage fracture Differential Diagnosis

## 2022-12-19 NOTE — ED ADULT NURSE NOTE - PRO INTERPRETER NEED 2
[FreeTextEntry1] : Nerve conduction velocity and EMG examination of the right upper extremity shows moderate level of right median nerve entrapment at the wrist.  There is no evidence of cervical paraspinal muscle or other muscles' spontaneous muscle potentials that could indicate acute radiculopathy.\par \par 
[place]

## 2022-12-19 NOTE — ED ADULT NURSE REASSESSMENT NOTE - NS ED NURSE REASSESS COMMENT FT1
Pt ambulated with steady gait but denies improvement in dizziness. Continues to feel weak, Alec GRIJALVA aware.

## 2022-12-19 NOTE — ED ADULT NURSE NOTE - NSIMPLEMENTINTERV_GEN_ALL_ED
Implemented All Fall Risk Interventions:  [place] to call system. Call bell, personal items and telephone within reach. Instruct patient to call for assistance. Room bathroom lighting operational. Non-slip footwear when patient is off stretcher. Physically safe environment: no spills, clutter or unnecessary equipment. Stretcher in lowest position, wheels locked, appropriate side rails in place. Provide visual cue, wrist band, yellow gown, etc. Monitor gait and stability. Monitor for mental status changes and reorient to person, place, and time. Review medications for side effects contributing to fall risk. Reinforce activity limits and safety measures with patient and family.

## 2022-12-19 NOTE — ED PROVIDER NOTE - PROGRESS NOTE DETAILS
chad (plastics) seen eval sutured pt, requests augmentin pt still symptomatic dizziness when ambulating, will admit. d/w luca (med) she will admit

## 2022-12-19 NOTE — H&P ADULT - ASSESSMENT
#[id number]  	77 yr old with PMH of DM2 HLD,Patient brought in by EMS status post syncopal episode.  Patient relates he had gotten up from bed.  [place] dizzy passed out woke up on the floor had pain to his left elbow face and had a bloody nose.  Patient unsure last tetanus.  Patient denies neck pain back pain leg pain chest pain shortness of breath abdominal pain nausea vomiting diarrhea. Per EMS patient's BP was very low upon their arrival however improved in route to ER.  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

## 2022-12-19 NOTE — ED PROVIDER NOTE - LAB INTERPRETATION
Flu With COVID-19 By OSMANI (12.19.22 @ 08:27)   SARS-CoV-2 Result: NotDetec: This Respiratory Panel uses polymerase chain reaction (PCR) to detect for   influenza A; influenza B; respiratory syncytial virus; and SARS-CoV-2.   Influenza A Result: NotDetec   Influenza B Result: NotDetec   Resp Syn Virus Result: NotDetec

## 2022-12-19 NOTE — ED ADULT NURSE NOTE - OBJECTIVE STATEMENT
76 y/o M PMH HLD, DM2, BIB EMS in [place] for syncope. Pt states he was walking to bathroom when he suddenly felt very weak and lost all strength in his legs. +head strike, +LOC, +ASA use. Endorsing left arm pain. No midline spinal tenderness, abrasion to nasal bridge, bleeding controlled, TINOCO x4. Denies CP, SOB, n/v/d, fevers, chills, abdominal pain, urinary symptoms, numbness, tingling in upper and lower extremities, HA, blurry vision. VSS updated on plan of care.

## 2022-12-19 NOTE — CONSULT NOTE ADULT - ASSESSMENT
The patient is a 77 year old male with a history of HTN (previously on medications), HL, DM, BPH who presents with syncope.     Plan:  - Based on description of symptoms and low blood pressure on arrival, likely an episode of vasovagal syncope  - ECG with no evidence of ischemia or infarction  - Check basic labs  - Give IV fluids  - Imaging of head, neck, and left arm pending  - From a cardiac standpoint, if above negative the patient can be discharged and follow-up as outpatient

## 2022-12-19 NOTE — ED PROVIDER NOTE - CLINICAL SUMMARY MEDICAL DECISION MAKING FREE TEXT BOX
#[id number]  Patient brought in by EMS status post syncopal episode.  Patient relates he had gotten up from bed.  [place] dizzy passed out woke up on the floor had pain to his left elbow face and had a bloody nose.  Patient unsure last tetanus.  Patient denies neck pain back pain leg pain chest pain shortness of breath abdominal pain nausea vomiting diarrhea. Per EMS patient's BP was very low upon their arrival however improved in route to ER.  Plan EKG CT head C-spine maxillofacial bones chest x-ray left elbow and wrist x-ray labs including troponin CBC coags CMP IV fluid IV tylenol tdap cardio consult  Differential including but not limited to arrhythmia MI electrolyte abnormality anemia intracranial hemorrhage fracture

## 2022-12-19 NOTE — CONSULT NOTE ADULT - SUBJECTIVE AND OBJECTIVE BOX
History of Present Illness: The patient is a 77 year old male with a history of HTN (previously on medications), HL, DM, BPH who presents with syncope. He woke up feeling a bit lightheaded. He went to the bathroom to urinate. After, he felt weak, more lightheaded, and passed out. No palpitations, chest pain, shortness of breath. He states he has had times felt lightheaded after urinating although he has not passed out before.    Past Medical/Surgical History:  HTN (previously on medications), HL, DM, BPH    Medications:  Unknown    Family History: Non-contributory family history of premature cardiovascular atherosclerotic disease    Social History: No tobacco, alcohol or drug use    Review of Systems:  General: No fevers, chills, weight gain  Skin: No rashes, color changes  Cardiovascular: No chest pain, orthopnea  Respiratory: No shortness of breath, cough  Gastrointestinal: No nausea, abdominal pain  Genitourinary: No incontinence, pain with urination  Musculoskeletal: No pain, swelling, decreased range of motion  Neurological: No headache, weakness  Psychiatric: No depression, anxiety  Endocrine: No weight gain, increased thirst  All other systems are comprehensively negative.    Physical Exam:  Vitals:        Vital Signs Last 24 Hrs  T(C): 36.7 (19 Dec 2022 08:15), Max: 36.7 (19 Dec 2022 08:15)  T(F): 98 (19 Dec 2022 08:15), Max: 98 (19 Dec 2022 08:15)  HR: 78 (19 Dec 2022 08:15) (78 - 86)  BP: 124/78 (19 Dec 2022 08:15) (92/53 - 124/78)  BP(mean): --  RR: 16 (19 Dec 2022 08:15) (16 - 18)  SpO2: 98% (19 Dec 2022 08:15) (98% - 98%)  Parameters below as of 19 Dec 2022 08:15  Patient On (Oxygen Delivery Method): room air  General: NAD  HEENT: MMM  Neck: No JVD, no carotid bruit  Lungs: CTAB  CV: RRR, nl S1/S2, no M/R/G  Abdomen: S/NT/ND, +BS  Extremities: No LE edema, no cyanosis  Neuro: AAOx3, non-focal  Skin: No rash    Labs:                        13.3   15.40 )-----------( 239      ( 19 Dec 2022 08:27 )             40.1     12-19    135  |  102  |  17  ----------------------------<  149<H>  4.7   |  26  |  1.27    Ca    8.8      19 Dec 2022 08:27    TPro  7.6  /  Alb  3.7  /  TBili  0.6  /  DBili  x   /  AST  31  /  ALT  21  /  AlkPhos  124<H>  12-19        PT/INR - ( 19 Dec 2022 08:27 )   PT: 11.8 sec;   INR: 1.00 ratio         PTT - ( 19 Dec 2022 08:27 )  PTT:28.1 sec    ECG/Telemetry: NSR, normal axis, nonspecific ST abnormality    
Acute conjunctivitis of left eye, unspecified acute conjunctivitis type

## 2022-12-19 NOTE — ED PROVIDER NOTE - OBJECTIVE STATEMENT
#[id number]  Patient brought in by EMS status post syncopal episode.  Patient relates he had gotten up from bed.  [place] dizzy passed out woke up on the floor had pain to his left elbow face and had a bloody nose.  Patient unsure last tetanus.  Patient denies neck pain back pain leg pain chest pain shortness of breath abdominal pain nausea vomiting diarrhea. Per EMS patient's BP was very low upon their arrival however improved in route to ER.  PMD Nolberto in [place]

## 2022-12-19 NOTE — ED PROVIDER NOTE - UPPER EXTREMITY EXAM, LEFT
OB Postpartum Note:  Delivery, POD#4    S: 35yo POD#3 s/p LTCS. The patient feels well.  Pain is well controlled. She is tolerating a regular diet and passing flatus. She is voiding spontaneously, and ambulating without difficulty. Denies CP/SOB. Denies lightheadedness/dizziness. Denies N/V.    O:  Vitals:  Vital Signs Last 24 Hrs  T(C): 36.8 (02 May 2018 06:26), Max: 37.1 (01 May 2018 19:05)  T(F): 98.2 (02 May 2018 06:26), Max: 98.8 (01 May 2018 19:05)  HR: 68 (02 May 2018 06:26) (68 - 72)  BP: 131/83 (02 May 2018 06:26) (122/77 - 131/83)  BP(mean): --  RR: 18 (02 May 2018 06:26) (17 - 18)  SpO2: 98% (02 May 2018 06:26) (98% - 100%)    MEDICATIONS  (STANDING):  acetaminophen   Tablet. 975 milliGRAM(s) Oral every 6 hours  diphtheria/tetanus/pertussis (acellular) Vaccine (ADAcel) 0.5 milliLiter(s) IntraMuscular once  ferrous    sulfate 325 milliGRAM(s) Oral daily  heparin  Injectable 5000 Unit(s) SubCutaneous every 12 hours  ibuprofen  Tablet 600 milliGRAM(s) Oral every 6 hours  influenza   Vaccine 0.5 milliLiter(s) IntraMuscular once  lactated ringers. 1000 milliLiter(s) (125 mL/Hr) IV Continuous <Continuous>  oxyCODONE    IR 5 milliGRAM(s) Oral every 3 hours  oxytocin Infusion 333.333 milliUNIT(s)/Min (1000 mL/Hr) IV Continuous <Continuous>  oxytocin Infusion 41.667 milliUNIT(s)/Min (125 mL/Hr) IV Continuous <Continuous>  oxytocin Infusion 41.667 milliUNIT(s)/Min (125 mL/Hr) IV Continuous <Continuous>  prenatal multivitamin 1 Tablet(s) Oral daily    MEDICATIONS  (PRN):  diphenhydrAMINE   Capsule 25 milliGRAM(s) Oral every 6 hours PRN Itching  docusate sodium 100 milliGRAM(s) Oral two times a day PRN Stool Softening  glycerin Suppository - Adult 1 Suppository(s) Rectal at bedtime PRN Constipation  lanolin Ointment 1 Application(s) Topical every 3 hours PRN Sore Nipples  oxyCODONE    IR 5 milliGRAM(s) Oral every 4 hours PRN Severe Pain (7 - 10)  simethicone 80 milliGRAM(s) Chew every 4 hours PRN Gas      LABS:  Blood type: O Positive  Rubella IgG: RPR:                     Physical exam:  Gen: NAD  Abdomen: Soft, nontender, no distension , firm uterine fundus at umbilicus.  Incision: Clean, dry, and intact   Pelvic: Normal lochia noted  Ext: No calf tenderness TENDERNESS

## 2022-12-19 NOTE — ED PROVIDER NOTE - SKIN, MLM
Skin normal color for race, warm, dry. + nasal abrasion Skin normal color for race, warm, dry. + nasal laceration

## 2022-12-19 NOTE — ED PROVIDER NOTE - ENMT, MLM
Airway patent, Nasal mucosa clear. + nasal abrasion + tender nasal bones. Mouth with normal mucosa. Throat has no vesicles, no oropharyngeal exudates and uvula is midline. Airway patent, Nasal mucosa clear. + nasal laceration + tender nasal bones. Mouth with normal mucosa. Throat has no vesicles, no oropharyngeal exudates and uvula is midline.

## 2022-12-19 NOTE — ED ADULT NURSE REASSESSMENT NOTE - NS ED NURSE REASSESS COMMENT FT1
Son Fred contacted for patient's medication list, showed RN list but unsure if he takes these medications multiple times throughout the day. Ginger GRIJALVA aware.

## 2022-12-19 NOTE — PATIENT PROFILE ADULT - FALL HARM RISK - HARM RISK INTERVENTIONS

## 2022-12-19 NOTE — ED ADULT NURSE REASSESSMENT NOTE - NS ED NURSE REASSESS COMMENT FT1
pt endorsed by previous shift RN , pt is awake, alert and oriented, able to ambulate self as noted in the ed, pt denies any pain at this time, noted suture on the nose, no active bleeding noted, pt is on room air maintaining saturation, pt is not in any acute distress at this time.

## 2022-12-19 NOTE — H&P ADULT - HISTORY OF PRESENT ILLNESS
#[id number]  	Patient brought in by EMS status post syncopal episode.  Patient relates he had gotten up from bed.  [place] dizzy passed out woke up on the floor had pain to his left elbow face and had a bloody nose.  Patient unsure last tetanus.  Patient denies neck pain back pain leg pain chest pain shortness of breath abdominal pain nausea vomiting diarrhea. Per EMS patient's BP was very low upon their arrival however improved in route to ER.  KEVIN Arvizu in [place]   Bp in ED 92/53, had ID consult, had CT head and face- old infarct and nasal bone fracture, sutured by plastic surg started on Abx, and being admitted for further care

## 2022-12-19 NOTE — ED ADULT NURSE NOTE - PAIN RATING/NUMBER SCALE (0-10): ACTIVITY
6 Referred To Otolaryngology For Closure Text (Leave Blank If You Do Not Want): After obtaining clear surgical margins the patient was sent to otolaryngology for surgical repair.  The patient understands they will receive post-surgical care and follow-up from the referring physician's office.

## 2022-12-19 NOTE — ED PROVIDER NOTE - WR ORDER NAME 3
[FreeTextEntry1] : 81 year old GUILLERMINA DEVLIN with CAD, DM, hypothyroidism, presents for annual visit for continued management of narcolepsy with cataplexy, severe obstructive sleep apnea and periodic breathing.\par \par The patient reports poor compliance related to mask discomfort.  She was referred to [facility] for a mask fitting.  The ramifications of untreated CLEO of severe degree and sleep deprivation were again reviewed with the patient and daughter.  She acknowledged understanding the risk and agreed to increase CPAP use nightly for as close to 7 hours as is feasible.  She will make an effort for an earlier bedtime to extend her sleep hours.  An order to renew her CPAP supplies will be sent to [facility]par \par A review of therapy and compliance data reveal average use 4 hrs 4 mins,  residual AHI of 43.1 on CPAP pressure of 10 cmH2O, with occasional mask leakage.  Average AMNA was 9.0.  27% of night in periodic breathing.  Consult with Dr. Ann.\par \par Hypersomnolence and cataplexy with narcolepsy are adequately controlled on armodafinil 150 mg in the morning, venlafaxine 100 mg twice daily.  She does not require a renewal of these prescriptions at this time.  She is reportedly closely monitored by her cardiologist and primary physician.\par \par The patient asked for a referral to a sleep physician in the [place] close to [place], as she considers a transfer of care.
Xray Elbow AP + Lateral + Oblique, Left

## 2022-12-20 LAB
A1C WITH ESTIMATED AVERAGE GLUCOSE RESULT: 7.2 % — HIGH (ref 4–5.6)
A1C WITH ESTIMATED AVERAGE GLUCOSE RESULT: 7.3 % — HIGH (ref 4–5.6)
ALBUMIN SERPL ELPH-MCNC: 3.9 G/DL — SIGNIFICANT CHANGE UP (ref 3.3–5)
ALP SERPL-CCNC: 126 U/L — HIGH (ref 30–120)
ALT FLD-CCNC: 21 U/L DA — SIGNIFICANT CHANGE UP (ref 10–60)
ANION GAP SERPL CALC-SCNC: 6 MMOL/L — SIGNIFICANT CHANGE UP (ref 5–17)
AST SERPL-CCNC: 21 U/L — SIGNIFICANT CHANGE UP (ref 10–40)
BASOPHILS # BLD AUTO: 0.06 K/UL — SIGNIFICANT CHANGE UP (ref 0–0.2)
BASOPHILS NFR BLD AUTO: 0.9 % — SIGNIFICANT CHANGE UP (ref 0–2)
BILIRUB SERPL-MCNC: 0.6 MG/DL — SIGNIFICANT CHANGE UP (ref 0.2–1.2)
BUN SERPL-MCNC: 14 MG/DL — SIGNIFICANT CHANGE UP (ref 7–23)
CALCIUM SERPL-MCNC: 8.8 MG/DL — SIGNIFICANT CHANGE UP (ref 8.4–10.5)
CHLORIDE SERPL-SCNC: 103 MMOL/L — SIGNIFICANT CHANGE UP (ref 96–108)
CHOLEST SERPL-MCNC: 134 MG/DL — SIGNIFICANT CHANGE UP
CO2 SERPL-SCNC: 28 MMOL/L — SIGNIFICANT CHANGE UP (ref 22–31)
CREAT SERPL-MCNC: 1.05 MG/DL — SIGNIFICANT CHANGE UP (ref 0.5–1.3)
EGFR: 73 ML/MIN/1.73M2 — SIGNIFICANT CHANGE UP
EOSINOPHIL # BLD AUTO: 0.22 K/UL — SIGNIFICANT CHANGE UP (ref 0–0.5)
EOSINOPHIL NFR BLD AUTO: 3.2 % — SIGNIFICANT CHANGE UP (ref 0–6)
ESTIMATED AVERAGE GLUCOSE: 160 MG/DL — HIGH (ref 68–114)
ESTIMATED AVERAGE GLUCOSE: 163 MG/DL — HIGH (ref 68–114)
GLUCOSE BLDC GLUCOMTR-MCNC: 120 MG/DL — HIGH (ref 70–99)
GLUCOSE BLDC GLUCOMTR-MCNC: 138 MG/DL — HIGH (ref 70–99)
GLUCOSE BLDC GLUCOMTR-MCNC: 86 MG/DL — SIGNIFICANT CHANGE UP (ref 70–99)
GLUCOSE BLDC GLUCOMTR-MCNC: 93 MG/DL — SIGNIFICANT CHANGE UP (ref 70–99)
GLUCOSE SERPL-MCNC: 124 MG/DL — HIGH (ref 70–99)
HCT VFR BLD CALC: 41.3 % — SIGNIFICANT CHANGE UP (ref 39–50)
HDLC SERPL-MCNC: 42 MG/DL — SIGNIFICANT CHANGE UP
HGB BLD-MCNC: 13.5 G/DL — SIGNIFICANT CHANGE UP (ref 13–17)
IMM GRANULOCYTES NFR BLD AUTO: 0.1 % — SIGNIFICANT CHANGE UP (ref 0–0.9)
LIPID PNL WITH DIRECT LDL SERPL: 70 MG/DL — SIGNIFICANT CHANGE UP
LYMPHOCYTES # BLD AUTO: 2.14 K/UL — SIGNIFICANT CHANGE UP (ref 1–3.3)
LYMPHOCYTES # BLD AUTO: 30.9 % — SIGNIFICANT CHANGE UP (ref 13–44)
MCHC RBC-ENTMCNC: 29.8 PG — SIGNIFICANT CHANGE UP (ref 27–34)
MCHC RBC-ENTMCNC: 32.7 GM/DL — SIGNIFICANT CHANGE UP (ref 32–36)
MCV RBC AUTO: 91.2 FL — SIGNIFICANT CHANGE UP (ref 80–100)
MONOCYTES # BLD AUTO: 0.57 K/UL — SIGNIFICANT CHANGE UP (ref 0–0.9)
MONOCYTES NFR BLD AUTO: 8.2 % — SIGNIFICANT CHANGE UP (ref 2–14)
NEUTROPHILS # BLD AUTO: 3.93 K/UL — SIGNIFICANT CHANGE UP (ref 1.8–7.4)
NEUTROPHILS NFR BLD AUTO: 56.7 % — SIGNIFICANT CHANGE UP (ref 43–77)
NON HDL CHOLESTEROL: 92 MG/DL — SIGNIFICANT CHANGE UP
NRBC # BLD: 0 /100 WBCS — SIGNIFICANT CHANGE UP (ref 0–0)
PLATELET # BLD AUTO: 249 K/UL — SIGNIFICANT CHANGE UP (ref 150–400)
POTASSIUM SERPL-MCNC: 4.7 MMOL/L — SIGNIFICANT CHANGE UP (ref 3.5–5.3)
POTASSIUM SERPL-SCNC: 4.7 MMOL/L — SIGNIFICANT CHANGE UP (ref 3.5–5.3)
PROT SERPL-MCNC: 7.6 G/DL — SIGNIFICANT CHANGE UP (ref 6–8.3)
RBC # BLD: 4.53 M/UL — SIGNIFICANT CHANGE UP (ref 4.2–5.8)
RBC # FLD: 12.4 % — SIGNIFICANT CHANGE UP (ref 10.3–14.5)
SODIUM SERPL-SCNC: 137 MMOL/L — SIGNIFICANT CHANGE UP (ref 135–145)
TRIGL SERPL-MCNC: 111 MG/DL — SIGNIFICANT CHANGE UP
WBC # BLD: 6.93 K/UL — SIGNIFICANT CHANGE UP (ref 3.8–10.5)
WBC # FLD AUTO: 6.93 K/UL — SIGNIFICANT CHANGE UP (ref 3.8–10.5)

## 2022-12-20 RX ADMIN — Medication 1 TABLET(S): at 17:51

## 2022-12-20 RX ADMIN — FINASTERIDE 5 MILLIGRAM(S): 5 TABLET, FILM COATED ORAL at 12:48

## 2022-12-20 RX ADMIN — METFORMIN HYDROCHLORIDE 1000 MILLIGRAM(S): 850 TABLET ORAL at 08:34

## 2022-12-20 RX ADMIN — ENOXAPARIN SODIUM 40 MILLIGRAM(S): 100 INJECTION SUBCUTANEOUS at 06:38

## 2022-12-20 RX ADMIN — OXYCODONE AND ACETAMINOPHEN 1 TABLET(S): 5; 325 TABLET ORAL at 16:25

## 2022-12-20 RX ADMIN — GABAPENTIN 300 MILLIGRAM(S): 400 CAPSULE ORAL at 17:51

## 2022-12-20 RX ADMIN — METFORMIN HYDROCHLORIDE 1000 MILLIGRAM(S): 850 TABLET ORAL at 17:51

## 2022-12-20 RX ADMIN — Medication 1 TABLET(S): at 06:38

## 2022-12-20 RX ADMIN — GABAPENTIN 300 MILLIGRAM(S): 400 CAPSULE ORAL at 06:38

## 2022-12-20 RX ADMIN — TAMSULOSIN HYDROCHLORIDE 0.4 MILLIGRAM(S): 0.4 CAPSULE ORAL at 22:17

## 2022-12-20 RX ADMIN — SIMVASTATIN 40 MILLIGRAM(S): 20 TABLET, FILM COATED ORAL at 22:17

## 2022-12-20 RX ADMIN — OXYCODONE AND ACETAMINOPHEN 1 TABLET(S): 5; 325 TABLET ORAL at 17:20

## 2022-12-20 RX ADMIN — Medication 1 TABLET(S): at 08:34

## 2022-12-20 NOTE — PHYSICAL THERAPY INITIAL EVALUATION ADULT - TRANSFER TRAINING, PT EVAL
Patient will perform transfers independently with a rolling walker within 1-2 weeks to promote safety and reduce risk of falls.

## 2022-12-20 NOTE — PHYSICAL THERAPY INITIAL EVALUATION ADULT - ADDITIONAL COMMENTS
As per care coordination assessment, pt lives with wife in a apartment. PTA he was independent with ADLs and ambulation with a cane and/or RW.

## 2022-12-20 NOTE — PHYSICAL THERAPY INITIAL EVALUATION ADULT - BED MOBILITY TRAINING, PT EVAL
Patient will perform bed mobility independently within 1-2 weeks to promote independence with functional mobility.

## 2022-12-20 NOTE — PHYSICAL THERAPY INITIAL EVALUATION ADULT - PERTINENT HX OF CURRENT PROBLEM, REHAB EVAL
Pt is a 78 y/o male BIBME s/p syncopal episode. Pt reported he felt dizzy passed out woke up on the floor had pain to his left elbow face and had a bloody nose. Per EMS pts BP was very low upon their arrival however improved in route to ER. X-ray wrist/elbow (12/19): no acute fracture-subluxation. CT cervical spine/head (12/19): no acute fx.

## 2022-12-20 NOTE — PHYSICAL THERAPY INITIAL EVALUATION ADULT - GAIT TRAINING, PT EVAL
Patient will ambulate 150 feet independently with a rolling walker within 1-2 weeks for safe household ambulation.

## 2022-12-21 ENCOUNTER — TRANSCRIPTION ENCOUNTER (OUTPATIENT)
Age: 77
End: 2022-12-21

## 2022-12-21 VITALS
OXYGEN SATURATION: 97 % | SYSTOLIC BLOOD PRESSURE: 149 MMHG | RESPIRATION RATE: 18 BRPM | HEART RATE: 75 BPM | DIASTOLIC BLOOD PRESSURE: 82 MMHG | TEMPERATURE: 98 F

## 2022-12-21 LAB
ALBUMIN SERPL ELPH-MCNC: 3.8 G/DL — SIGNIFICANT CHANGE UP (ref 3.3–5)
ALP SERPL-CCNC: 126 U/L — HIGH (ref 30–120)
ALT FLD-CCNC: 19 U/L DA — SIGNIFICANT CHANGE UP (ref 10–60)
ANION GAP SERPL CALC-SCNC: 10 MMOL/L — SIGNIFICANT CHANGE UP (ref 5–17)
AST SERPL-CCNC: 19 U/L — SIGNIFICANT CHANGE UP (ref 10–40)
BILIRUB SERPL-MCNC: 0.5 MG/DL — SIGNIFICANT CHANGE UP (ref 0.2–1.2)
BUN SERPL-MCNC: 21 MG/DL — SIGNIFICANT CHANGE UP (ref 7–23)
CALCIUM SERPL-MCNC: 9 MG/DL — SIGNIFICANT CHANGE UP (ref 8.4–10.5)
CHLORIDE SERPL-SCNC: 99 MMOL/L — SIGNIFICANT CHANGE UP (ref 96–108)
CO2 SERPL-SCNC: 24 MMOL/L — SIGNIFICANT CHANGE UP (ref 22–31)
CREAT SERPL-MCNC: 1.32 MG/DL — HIGH (ref 0.5–1.3)
CULTURE RESULTS: SIGNIFICANT CHANGE UP
EGFR: 55 ML/MIN/1.73M2 — LOW
GLUCOSE BLDC GLUCOMTR-MCNC: 102 MG/DL — HIGH (ref 70–99)
GLUCOSE BLDC GLUCOMTR-MCNC: 141 MG/DL — HIGH (ref 70–99)
GLUCOSE SERPL-MCNC: 208 MG/DL — HIGH (ref 70–99)
HCT VFR BLD CALC: 41.9 % — SIGNIFICANT CHANGE UP (ref 39–50)
HGB BLD-MCNC: 14 G/DL — SIGNIFICANT CHANGE UP (ref 13–17)
MCHC RBC-ENTMCNC: 30 PG — SIGNIFICANT CHANGE UP (ref 27–34)
MCHC RBC-ENTMCNC: 33.4 GM/DL — SIGNIFICANT CHANGE UP (ref 32–36)
MCV RBC AUTO: 89.7 FL — SIGNIFICANT CHANGE UP (ref 80–100)
NRBC # BLD: 0 /100 WBCS — SIGNIFICANT CHANGE UP (ref 0–0)
PLATELET # BLD AUTO: 225 K/UL — SIGNIFICANT CHANGE UP (ref 150–400)
POTASSIUM SERPL-MCNC: 4.2 MMOL/L — SIGNIFICANT CHANGE UP (ref 3.5–5.3)
POTASSIUM SERPL-SCNC: 4.2 MMOL/L — SIGNIFICANT CHANGE UP (ref 3.5–5.3)
PROT SERPL-MCNC: 7.7 G/DL — SIGNIFICANT CHANGE UP (ref 6–8.3)
RBC # BLD: 4.67 M/UL — SIGNIFICANT CHANGE UP (ref 4.2–5.8)
RBC # FLD: 12.4 % — SIGNIFICANT CHANGE UP (ref 10.3–14.5)
SODIUM SERPL-SCNC: 133 MMOL/L — LOW (ref 135–145)
SPECIMEN SOURCE: SIGNIFICANT CHANGE UP
WBC # BLD: 7.37 K/UL — SIGNIFICANT CHANGE UP (ref 3.8–10.5)
WBC # FLD AUTO: 7.37 K/UL — SIGNIFICANT CHANGE UP (ref 3.8–10.5)

## 2022-12-21 RX ORDER — LACTOBACILLUS ACIDOPHILUS 100MM CELL
1 CAPSULE ORAL
Qty: 20 | Refills: 0
Start: 2022-12-21 | End: 2022-12-30

## 2022-12-21 RX ADMIN — Medication 1 TABLET(S): at 05:59

## 2022-12-21 RX ADMIN — Medication 1 TABLET(S): at 08:22

## 2022-12-21 RX ADMIN — GABAPENTIN 300 MILLIGRAM(S): 400 CAPSULE ORAL at 05:58

## 2022-12-21 RX ADMIN — ENOXAPARIN SODIUM 40 MILLIGRAM(S): 100 INJECTION SUBCUTANEOUS at 05:59

## 2022-12-21 RX ADMIN — METFORMIN HYDROCHLORIDE 1000 MILLIGRAM(S): 850 TABLET ORAL at 08:22

## 2022-12-21 RX ADMIN — FINASTERIDE 5 MILLIGRAM(S): 5 TABLET, FILM COATED ORAL at 13:43

## 2022-12-21 NOTE — DISCHARGE NOTE PROVIDER - HOSPITAL COURSE
77 yr old with PMH of DM2 HLD,Patient brought in by EMS status post syncopal episode.  Patient relates he had gotten up from bed.  [place] dizzy passed out woke up on the floor had pain to his left elbow face and had a bloody nose.  Patient unsure last tetanus.  Patient denies neck pain back pain leg pain chest pain shortness of breath abdominal pain nausea vomiting diarrhea. Per EMS patient's BP was very low upon their arrival however improved in route to ER.  PMD Nolberto in [place]   Bp in ED 92/53, had ID consult, had CT head and face- old infarct and nasal bone fracture, sutured by plastic surg started on Abx, and being admitted for further care  for  s/p fall , syncope most likely vasovagal with hypotension with hypovolemia   fracture RT nasal bone with open wound sutured  by plastic surg  DM2 with neuropathy hba1c 7.3  HLD   orthostatic hypotension with hypovolemia improved  cardio consult noted Imaging of head, neck, and left arm with no acute fractures  - Ambulate - if not significantly symptomatic, ok for discharge from a cardiac standpoint  ECG with no evidence of ischemia or infarction  stable at discharge out pt f up with cardio , pcp

## 2022-12-21 NOTE — PROGRESS NOTE ADULT - ASSESSMENT
#[id number]  	77 yr old with PMH of DM2 HLD,Patient brought in by EMS status post syncopal episode.  Patient relates he had gotten up from bed.  [place] dizzy passed out woke up on the floor had pain to his left elbow face and had a bloody nose.  Patient unsure last tetanus.  Patient denies neck pain back pain leg pain chest pain shortness of breath abdominal pain nausea vomiting diarrhea. Per EMS patient's BP was very low upon their arrival however improved in route to ER.  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  
The patient is a 77 year old male with a history of HTN (previously on medications), HL, DM, BPH who presents with syncope.     Plan:  - Based on description of symptoms and low blood pressure on arrival, likely an episode of vasovagal syncope  - There was also evidence of orthostatic changes on vitals  - ECG with no evidence of ischemia or infarction  - Imaging of head, neck, and left arm with no acute fractures  - Ok for discharge from a cardiac standpoint
 #[id number]  	77 yr old with PMH of DM2 HLD,Patient brought in by EMS status post syncopal episode.  Patient relates he had gotten up from bed.  [place] dizzy passed out woke up on the floor had pain to his left elbow face and had a bloody nose.  Patient unsure last tetanus.  Patient denies neck pain back pain leg pain chest pain shortness of breath abdominal pain nausea vomiting diarrhea. Per EMS patient's BP was very low upon their arrival however improved in route to ER.  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  
The patient is a 77 year old male with a history of HTN (previously on medications), HL, DM, BPH who presents with syncope.     Plan:  - Based on description of symptoms and low blood pressure on arrival, likely an episode of vasovagal syncope  - There was also evidence of orthostatic changes on vitals  - ECG with no evidence of ischemia or infarction  - Imaging of head, neck, and left arm with no acute fractures  - Ambulate - if not significantly symptomatic, ok for discharge from a cardiac standpoint

## 2022-12-21 NOTE — PROGRESS NOTE ADULT - PROBLEM SELECTOR PLAN 1
likely vasovagal, receive iV hydration , monitor cardiac wise and orthostais, fall pecaution
likely vasovagal, receive iV hydration , monitor cardiac wise and orthostais, fall pecaution

## 2022-12-21 NOTE — DISCHARGE NOTE PROVIDER - NSDCCPCAREPLAN_GEN_ALL_CORE_FT
PRINCIPAL DISCHARGE DIAGNOSIS  Diagnosis: Syncope  Assessment and Plan of Treatment: resolved      SECONDARY DISCHARGE DIAGNOSES  Diagnosis: Nasal fracture  Assessment and Plan of Treatment: f up with surg

## 2022-12-21 NOTE — PROGRESS NOTE ADULT - PROBLEM SELECTOR PLAN 2
pain management, plastic surg f up, augmentin as advised by plastic surg
pain management, plastic surg f up, augmentin as advised by plastic surg

## 2022-12-21 NOTE — DISCHARGE NOTE PROVIDER - NSDCMRMEDTOKEN_GEN_ALL_CORE_FT
amoxicillin-clavulanate 875 mg-125 mg oral tablet: 1 tab(s) orally 2 times a day  finasteride 5 mg oral tablet: 1 tab(s) orally once a day  gabapentin 300 mg oral tablet: 300 milligram(s) orally once a day  lactobacillus acidophilus oral capsule: 1 tab(s) orally 2 times a day   metFORMIN: 1000 milligram(s) orally  Percocet 5/325 oral tablet:   simvastatin 40 mg oral tablet: 1 tab(s) orally once a day (at bedtime)  tamsulosin 0.4 mg oral capsule: 1 cap(s) orally once a day

## 2022-12-21 NOTE — DISCHARGE NOTE NURSING/CASE MANAGEMENT/SOCIAL WORK - PATIENT PORTAL LINK FT
You can access the FollowMyHealth Patient Portal offered by [facility] by registering at the following website: [email or web address]. By joining [facility]’s FollowMyHealth portal, you will also be able to view your health information using other applications (apps) compatible with our system.

## 2022-12-21 NOTE — DISCHARGE NOTE NURSING/CASE MANAGEMENT/SOCIAL WORK - NSDCVIVACCINE_GEN_ALL_CORE_FT
Tdap; 19-Dec-2022 08:54; Colletta, Morgan (TIA); [employer]; [id number] (Exp. Date: 08-Dec-2024); IntraMuscular; Deltoid Right.; 0.5 milliLiter(s); VIS (VIS Published: 09-May-2013, VIS Presented: 19-Dec-2022);

## 2022-12-21 NOTE — PROGRESS NOTE ADULT - TIME BILLING
I have discussed care plan with patient and HCP,expressed understanding of problems treatment and their effect and side effects, alternatives in detail,I have asked if they have any questions and concerns and appropriately addressed them to best of my ability  Reviewed all diagonostic tests, lab results and drug drug interactions, and medications
I have discussed care plan with patient and HCP,expressed understanding of problems treatment and their effect and side effects, alternatives in detail,I have asked if they have any questions and concerns and appropriately addressed them to best of my ability  Reviewed all diagonostic tests, lab results and drug drug interactions, and medications

## 2022-12-21 NOTE — PROGRESS NOTE ADULT - SUBJECTIVE AND OBJECTIVE BOX
Chief Complaint: Syncope    Interval Events: No events overnight.    Review of Systems:  General: No fevers, chills, weight gain  Skin: No rashes, color changes  Cardiovascular: No chest pain, orthopnea  Respiratory: No shortness of breath, cough  Gastrointestinal: No nausea, abdominal pain  Genitourinary: No incontinence, pain with urination  Musculoskeletal: +pain, swelling, decreased range of motion  Neurological: No headache, weakness  Psychiatric: No depression, anxiety  Endocrine: No weight gain, increased thirst  All other systems are comprehensively negative.    Physical Exam:  Vitals:        Vital Signs Last 24 Hrs  T(C): 36.5 (20 Dec 2022 08:00), Max: 37.2 (19 Dec 2022 23:28)  T(F): 97.7 (20 Dec 2022 08:00), Max: 98.9 (19 Dec 2022 23:28)  HR: 67 (20 Dec 2022 08:00) (67 - 73)  BP: 161/81 (20 Dec 2022 08:00) (122/70 - 172/84)  BP(mean): --  RR: 18 (20 Dec 2022 08:00) (17 - 22)  SpO2: 96% (20 Dec 2022 08:00) (95% - 98%)  Parameters below as of 20 Dec 2022 08:00  Patient On (Oxygen Delivery Method): room air  General: NAD  HEENT: MMM  Neck: No JVD, no carotid bruit  Lungs: CTAB  CV: RRR, nl S1/S2, no M/R/G  Abdomen: S/NT/ND, +BS  Extremities: No LE edema, no cyanosis  Neuro: AAOx3, non-focal  Skin: No rash    Labs:                        13.5   6.93  )-----------( 249      ( 20 Dec 2022 08:22 )             41.3     12-20    137  |  103  |  14  ----------------------------<  124<H>  4.7   |  28  |  1.05    Ca    8.8      20 Dec 2022 08:22    TPro  7.6  /  Alb  3.9  /  TBili  0.6  /  DBili  x   /  AST  21  /  ALT  21  /  AlkPhos  126<H>  12-20        PT/INR - ( 19 Dec 2022 08:27 )   PT: 11.8 sec;   INR: 1.00 ratio         PTT - ( 19 Dec 2022 08:27 )  PTT:28.1 sec    ECG/Telemetry: Sinus rhythm
Patient is a 77y old  Male who presents with a chief complaint of s/p fall and syncope (19 Dec 2022 17:53)      INTERVAL HPI/OVERNIGHT EVENTS:overnight events noted    Home Medications:  finasteride 5 mg oral tablet: 1 tab(s) orally once a day (19 Dec 2022 18:07)  gabapentin 300 mg oral tablet: 300 milligram(s) orally once a day (19 Dec 2022 18:07)  metFORMIN: 1000 milligram(s) orally (19 Dec 2022 18:07)  Percocet 5/325 oral tablet:  (19 Dec 2022 18:07)  simvastatin 40 mg oral tablet: 1 tab(s) orally once a day (at bedtime) (19 Dec 2022 18:07)  tamsulosin 0.4 mg oral capsule: 1 cap(s) orally once a day (19 Dec 2022 18:07)      MEDICATIONS  (STANDING):  amoxicillin  875 milliGRAM(s)/clavulanate 1 Tablet(s) Oral two times a day  dextrose 5%. 1000 milliLiter(s) (100 mL/Hr) IV Continuous <Continuous>  dextrose 5%. 1000 milliLiter(s) (50 mL/Hr) IV Continuous <Continuous>  dextrose 50% Injectable 25 Gram(s) IV Push once  dextrose 50% Injectable 12.5 Gram(s) IV Push once  dextrose 50% Injectable 25 Gram(s) IV Push once  enoxaparin Injectable 40 milliGRAM(s) SubCutaneous every 24 hours  finasteride 5 milliGRAM(s) Oral daily  gabapentin 300 milliGRAM(s) Oral two times a day  glucagon  Injectable 1 milliGRAM(s) IntraMuscular once  insulin lispro (ADMELOG) corrective regimen sliding scale   SubCutaneous three times a day before meals  insulin lispro (ADMELOG) corrective regimen sliding scale   SubCutaneous at bedtime  lactobacillus acidophilus 1 Tablet(s) Oral two times a day with meals  metFORMIN 1000 milliGRAM(s) Oral two times a day  simvastatin 40 milliGRAM(s) Oral at bedtime  tamsulosin 0.4 milliGRAM(s) Oral at bedtime    MEDICATIONS  (PRN):  acetaminophen     Tablet .. 650 milliGRAM(s) Oral every 6 hours PRN Temp greater or equal to 38C (100.4F), Mild Pain (1 - 3)  dextrose Oral Gel 15 Gram(s) Oral once PRN Blood Glucose LESS THAN 70 milliGRAM(s)/deciliter  oxycodone    5 mG/acetaminophen 325 mG 1 Tablet(s) Oral every 6 hours PRN Severe Pain (7 - 10)      Allergies    No Known Allergies    Intolerances        REVIEW OF SYSTEMS:  CONSTITUTIONAL: No fever, weight loss, or fatigue  EYES: No eye pain, visual disturbances, or discharge  ENMT:  No difficulty hearing, tinnitus, vertigo; No sinus or throat pain  NECK: No pain or stiffness  BREASTS: No pain, masses, or nipple discharge  RESPIRATORY: No cough, wheezing, chills or hemoptysis; No shortness of breath  CARDIOVASCULAR: No chest pain, palpitations, dizziness, or leg swelling  GASTROINTESTINAL: No abdominal or epigastric pain. No nausea, vomiting, or hematemesis; No diarrhea or constipation. No melena or hematochezia.  GENITOURINARY: No dysuria, frequency, hematuria, or incontinence  NEUROLOGICAL: No headaches, memory loss, loss of strength, numbness, or tremors  SKIN: No itching, burning, rashes, or lesions   LYMPH NODES: No enlarged glands  ENDOCRINE: No heat or cold intolerance; No hair loss  MUSCULOSKELETAL: pain left arm    Vital Signs Last 24 Hrs  T(C): 36.5 (20 Dec 2022 08:00), Max: 37.2 (19 Dec 2022 23:28)  T(F): 97.7 (20 Dec 2022 08:00), Max: 98.9 (19 Dec 2022 23:28)  HR: 67 (20 Dec 2022 08:00) (67 - 73)  BP: 161/81 (20 Dec 2022 08:00) (132/70 - 172/84)  BP(mean): --  RR: 18 (20 Dec 2022 08:00) (17 - 22)  SpO2: 96% (20 Dec 2022 08:00) (95% - 98%)    Parameters below as of 20 Dec 2022 08:00  Patient On (Oxygen Delivery Method): room air        PHYSICAL EXAM:  GENERAL: well-groomed, well-developed  HEAD:  Atraumatic, Normocephalic  EYES: EOMI, PERRLA, conjunctiva and sclera clear  ENMT: Moist mucous membranes,   NECK: Supple, No JVD, Normal thyroid  NERVOUS SYSTEM:  Alert & Oriented X3, Good concentration; Motor Strength 5/5 B/L upper and lower extremities; DTRs 2+ intact and symmetric  CHEST/LUNG: Clear to percussion bilaterally; No rales, rhonchi, wheezing, or rubs  HEART: Regular rate and rhythm; No murmurs, rubs, or gallops  ABDOMEN: Soft, Nontender, Nondistended; Bowel sounds present  EXTREMITIES:  2+ Peripheral Pulses, No clubbing, cyanosis, or edema    LABS:                        13.5   6.93  )-----------( 249      ( 20 Dec 2022 08:22 )             41.3     12    137  |  103  |  14  ----------------------------<  124<H>  4.7   |  28  |  1.05    Ca    8.8      20 Dec 2022 08:22    TPro  7.6  /  Alb  3.9  /  TBili  0.6  /  DBili  x   /  AST  21  /  ALT  21  /  AlkPhos  126<H>  12-20    PT/INR - ( 19 Dec 2022 08:27 )   PT: 11.8 sec;   INR: 1.00 ratio         PTT - ( 19 Dec 2022 08:27 )  PTT:28.1 sec  Urinalysis Basic - ( 19 Dec 2022 10:07 )    Color: Yellow / Appearance: Clear / S.015 / pH: x  Gluc: x / Ketone: Negative  / Bili: Negative / Urobili: Negative mg/dL   Blood: x / Protein: 15 mg/dL / Nitrite: Negative   Leuk Esterase: Negative / RBC: 0-2 /HPF / WBC 0-2   Sq Epi: x / Non Sq Epi: Negative / Bacteria: Negative      CAPILLARY BLOOD GLUCOSE      POCT Blood Glucose.: 93 mg/dL (20 Dec 2022 12:15)  POCT Blood Glucose.: 120 mg/dL (20 Dec 2022 07:43)  POCT Blood Glucose.: 149 mg/dL (19 Dec 2022 21:17)          I&O's Summary    19 Dec 2022 07:01  -  20 Dec 2022 07:00  --------------------------------------------------------  IN: 0 mL / OUT: 700 mL / NET: -700 mL        RADIOLOGY & ADDITIONAL TESTS:    Imaging Personally Reviewed:  [ ] YES  [ ] NO    Consultant(s) Notes Reviewed:  [ ] YES  [ ] NO    Care Discussed with Consultants/Other Providers [ ] YES  [ ] NO
Chief Complaint: Syncope    Interval Events: No events overnight.    Review of Systems:  General: No fevers, chills, weight gain  Skin: No rashes, color changes  Cardiovascular: No chest pain, orthopnea  Respiratory: No shortness of breath, cough  Gastrointestinal: No nausea, abdominal pain  Genitourinary: No incontinence, pain with urination  Musculoskeletal: +pain, swelling, decreased range of motion  Neurological: No headache, weakness  Psychiatric: No depression, anxiety  Endocrine: No weight gain, increased thirst  All other systems are comprehensively negative.    Physical Exam:  Vital Signs Last 24 Hrs  T(C): 36.7 (21 Dec 2022 07:51), Max: 36.9 (20 Dec 2022 16:02)  T(F): 98.1 (21 Dec 2022 07:51), Max: 98.4 (20 Dec 2022 16:02)  HR: 75 (21 Dec 2022 07:51) (75 - 89)  BP: 149/82 (21 Dec 2022 07:51) (115/70 - 150/79)  BP(mean): --  RR: 18 (21 Dec 2022 07:51) (18 - 18)  SpO2: 97% (21 Dec 2022 07:51) (92% - 97%)  Parameters below as of 21 Dec 2022 07:51  Patient On (Oxygen Delivery Method): room air  General: NAD  HEENT: MMM  Neck: No JVD, no carotid bruit  Lungs: CTAB  CV: RRR, nl S1/S2, no M/R/G  Abdomen: S/NT/ND, +BS  Extremities: No LE edema, no cyanosis  Neuro: AAOx3, non-focal  Skin: No rash    Labs:             12-20    137  |  103  |  14  ----------------------------<  124<H>  4.7   |  28  |  1.05    Ca    8.8      20 Dec 2022 08:22    TPro  7.6  /  Alb  3.9  /  TBili  0.6  /  DBili  x   /  AST  21  /  ALT  21  /  AlkPhos  126<H>  12-20                        13.5   6.93  )-----------( 249      ( 20 Dec 2022 08:22 )             41.3       ECG/Telemetry: Sinus rhythm
Neurology follow up note    BYLACHO [id number]      Interval History:    Patient feels ok no new complaints.    Allergies    No Known Allergies    Intolerances        MEDICATIONS    acetaminophen     Tablet .. 650 milliGRAM(s) Oral every 6 hours PRN  amoxicillin  875 milliGRAM(s)/clavulanate 1 Tablet(s) Oral two times a day  dextrose 5%. 1000 milliLiter(s) IV Continuous <Continuous>  dextrose 5%. 1000 milliLiter(s) IV Continuous <Continuous>  dextrose 50% Injectable 25 Gram(s) IV Push once  dextrose 50% Injectable 12.5 Gram(s) IV Push once  dextrose 50% Injectable 25 Gram(s) IV Push once  dextrose Oral Gel 15 Gram(s) Oral once PRN  enoxaparin Injectable 40 milliGRAM(s) SubCutaneous every 24 hours  finasteride 5 milliGRAM(s) Oral daily  gabapentin 300 milliGRAM(s) Oral two times a day  glucagon  Injectable 1 milliGRAM(s) IntraMuscular once  insulin lispro (ADMELOG) corrective regimen sliding scale   SubCutaneous three times a day before meals  insulin lispro (ADMELOG) corrective regimen sliding scale   SubCutaneous at bedtime  lactobacillus acidophilus 1 Tablet(s) Oral two times a day with meals  metFORMIN 1000 milliGRAM(s) Oral two times a day  oxycodone    5 mG/acetaminophen 325 mG 1 Tablet(s) Oral every 6 hours PRN  simvastatin 40 milliGRAM(s) Oral at bedtime  tamsulosin 0.4 milliGRAM(s) Oral at bedtime              Vital Signs Last 24 Hrs  T(C): 36.7 (21 Dec 2022 07:51), Max: 36.9 (20 Dec 2022 16:02)  T(F): 98.1 (21 Dec 2022 07:51), Max: 98.4 (20 Dec 2022 16:02)  HR: 75 (21 Dec 2022 07:51) (75 - 89)  BP: 149/82 (21 Dec 2022 07:51) (115/70 - 150/79)  BP(mean): --  RR: 18 (21 Dec 2022 07:51) (18 - 18)  SpO2: 97% (21 Dec 2022 07:51) (92% - 97%)    Parameters below as of 21 Dec 2022 07:51  Patient On (Oxygen Delivery Method): room air        REVIEW OF SYSTEMS:  Constitutional:  The patient denies fever, chills, or night sweats.  Head:  Positive pain across the bridge of his nose, but he has trauma.  Eyes:  No double vision or blurry vision.  Ears:  No ringing in his ears.  Neck:  No neck pain.  Cardiovascular:  No chest pain.  Respiratory:  No shortness of breath.  Abdomen:  No nausea, vomiting, or abdominal pain.  Extremities/Neurological:  No numbness or tingling.  Musculoskeletal:  Positive left elbow pain.    PHYSICAL EXAMINATION:   HEENT:  Head:  Normocephalic, atraumatic.  Eyes:  No scleral icterus.  Ears:  Hearing bilaterally was intact.  NECK:  Supple.  CARDIOVASCULAR:  S1 and S2 heard.  RESPIRATORY:  Good air entry bilaterally.  ABDOMEN:  Soft, nontender.  EXTREMITIES:  No clubbing or cyanosis were noted.      NEUROLOGIC:  The patient was awake and alert.  Extraocular movements were intact.  Speech was fluent.  Smile symmetric.  Motor, bilateral upper and lower 4+/5.  Sensory, bilateral upper and lower intact to light touch. able to walk in room states no  dizziness               LABS:  CBC Full  -  ( 21 Dec 2022 06:00 )  WBC Count : 7.37 K/uL  RBC Count : 4.67 M/uL  Hemoglobin : 14.0 g/dL  Hematocrit : 41.9 %  Platelet Count - Automated : 225 K/uL  Mean Cell Volume : 89.7 fl  Mean Cell Hemoglobin : 30.0 pg  Mean Cell Hemoglobin Concentration : 33.4 gm/dL  Auto Neutrophil # : x  Auto Lymphocyte # : x  Auto Monocyte # : x  Auto Eosinophil # : x  Auto Basophil # : x  Auto Neutrophil % : x  Auto Lymphocyte % : x  Auto Monocyte % : x  Auto Eosinophil % : x  Auto Basophil % : x      12-21    133<L>  |  99  |  21  ----------------------------<  208<H>  4.2   |  24  |  1.32<H>    Ca    9.0      21 Dec 2022 06:00    TPro  7.7  /  Alb  3.8  /  TBili  0.5  /  DBili  x   /  AST  19  /  ALT  19  /  AlkPhos  126<H>  12-21    Hemoglobin A1C:     LIVER FUNCTIONS - ( 21 Dec 2022 06:00 )  Alb: 3.8 g/dL / Pro: 7.7 g/dL / ALK PHOS: 126 U/L / ALT: 19 U/L DA / AST: 19 U/L / GGT: x           Vitamin B12         RADIOLOGY      ANALYSIS AND PLAN:  This is a 77-year-old with episode of unresponsiveness.  For episode of unresponsiveness, as per my conversation with son and spouse at bedside, this appears to be a syncopal event.  No clear new signs on examination to suggest a new cerebrovascular accident has ensued.  Examination is nonfocal and no history to suggest epilepsy.  For orthostatics, Cardiology followup, adjustments of medications as needed.  IV fluids.  CT imaging noted, suspect these are incidental findings.  No prior history of stroke on examination, the patient is already on aspirin and anti-cholesterol medications.  For diabetes, strict control of blood sugars.  For hyperlipidemia, continue the patient on statin.  spoke to staff patient able to walk well   spoke to son as per him also father no dizziness 12/21    Greater than 45 minutes of time was spent with the patient, plan of care, reviewing data, with greater than 50% of the visit was spent counseling and/or coordinating care with multidisciplinary healthcare team    
Neurology follow up note    BYLACHO [id number]      Interval History:    Patient feels ok no new complaints.    Allergies    No Known Allergies    Intolerances        MEDICATIONS    acetaminophen     Tablet .. 650 milliGRAM(s) Oral every 6 hours PRN  amoxicillin  875 milliGRAM(s)/clavulanate 1 Tablet(s) Oral two times a day  dextrose 5%. 1000 milliLiter(s) IV Continuous <Continuous>  dextrose 5%. 1000 milliLiter(s) IV Continuous <Continuous>  dextrose 50% Injectable 25 Gram(s) IV Push once  dextrose 50% Injectable 12.5 Gram(s) IV Push once  dextrose 50% Injectable 25 Gram(s) IV Push once  dextrose Oral Gel 15 Gram(s) Oral once PRN  enoxaparin Injectable 40 milliGRAM(s) SubCutaneous every 24 hours  finasteride 5 milliGRAM(s) Oral daily  gabapentin 300 milliGRAM(s) Oral two times a day  glucagon  Injectable 1 milliGRAM(s) IntraMuscular once  insulin lispro (ADMELOG) corrective regimen sliding scale   SubCutaneous three times a day before meals  insulin lispro (ADMELOG) corrective regimen sliding scale   SubCutaneous at bedtime  lactobacillus acidophilus 1 Tablet(s) Oral two times a day with meals  metFORMIN 1000 milliGRAM(s) Oral two times a day  oxycodone    5 mG/acetaminophen 325 mG 1 Tablet(s) Oral every 6 hours PRN  simvastatin 40 milliGRAM(s) Oral at bedtime  tamsulosin 0.4 milliGRAM(s) Oral at bedtime              Vital Signs Last 24 Hrs  T(C): 36.5 (20 Dec 2022 08:00), Max: 37.2 (19 Dec 2022 23:28)  T(F): 97.7 (20 Dec 2022 08:00), Max: 98.9 (19 Dec 2022 23:28)  HR: 67 (20 Dec 2022 08:00) (67 - 73)  BP: 161/81 (20 Dec 2022 08:00) (132/70 - 172/84)  BP(mean): --  RR: 18 (20 Dec 2022 08:00) (17 - 22)  SpO2: 96% (20 Dec 2022 08:00) (95% - 98%)    Parameters below as of 20 Dec 2022 08:00  Patient On (Oxygen Delivery Method): room air          REVIEW OF SYSTEMS:  Constitutional:  The patient denies fever, chills, or night sweats.  Head:  Positive pain across the bridge of his nose, but he has trauma.  Eyes:  No double vision or blurry vision.  Ears:  No ringing in his ears.  Neck:  No neck pain.  Cardiovascular:  No chest pain.  Respiratory:  No shortness of breath.  Abdomen:  No nausea, vomiting, or abdominal pain.  Extremities/Neurological:  No numbness or tingling.  Musculoskeletal:  Positive left elbow pain.    PHYSICAL EXAMINATION:   HEENT:  Head:  Normocephalic, atraumatic.  Eyes:  No scleral icterus.  Ears:  Hearing bilaterally was intact.  NECK:  Supple.  CARDIOVASCULAR:  S1 and S2 heard.  RESPIRATORY:  Good air entry bilaterally.  ABDOMEN:  Soft, nontender.  EXTREMITIES:  No clubbing or cyanosis were noted.      NEUROLOGIC:  The patient was awake and alert.  Extraocular movements were intact.  Speech was fluent.  Smile symmetric.  Motor, bilateral upper and lower 4+/5.  Sensory, bilateral upper and lower intact to light touch.              LABS:  CBC Full  -  ( 20 Dec 2022 08:22 )  WBC Count : 6.93 K/uL  RBC Count : 4.53 M/uL  Hemoglobin : 13.5 g/dL  Hematocrit : 41.3 %  Platelet Count - Automated : 249 K/uL  Mean Cell Volume : 91.2 fl  Mean Cell Hemoglobin : 29.8 pg  Mean Cell Hemoglobin Concentration : 32.7 gm/dL  Auto Neutrophil # : 3.93 K/uL  Auto Lymphocyte # : 2.14 K/uL  Auto Monocyte # : 0.57 K/uL  Auto Eosinophil # : 0.22 K/uL  Auto Basophil # : 0.06 K/uL  Auto Neutrophil % : 56.7 %  Auto Lymphocyte % : 30.9 %  Auto Monocyte % : 8.2 %  Auto Eosinophil % : 3.2 %  Auto Basophil % : 0.9 %    Urinalysis Basic - ( 19 Dec 2022 10:07 )    Color: Yellow / Appearance: Clear / S.015 / pH: x  Gluc: x / Ketone: Negative  / Bili: Negative / Urobili: Negative mg/dL   Blood: x / Protein: 15 mg/dL / Nitrite: Negative   Leuk Esterase: Negative / RBC: 0-2 /HPF / WBC 0-2   Sq Epi: x / Non Sq Epi: Negative / Bacteria: Negative      12-20    137  |  103  |  14  ----------------------------<  124<H>  4.7   |  28  |  1.05    Ca    8.8      20 Dec 2022 08:22    TPro  7.6  /  Alb  3.9  /  TBili  0.6  /  DBili  x   /  AST  21  /  ALT  21  /  AlkPhos  126<H>  12-20    Hemoglobin A1C:   Lipid Panel  @ 08:22  Total Cholesterol, Serum 134  LDL --  Triglycerides 111    LIVER FUNCTIONS - ( 20 Dec 2022 08:22 )  Alb: 3.9 g/dL / Pro: 7.6 g/dL / ALK PHOS: 126 U/L / ALT: 21 U/L DA / AST: 21 U/L / GGT: x           Vitamin B12   PT/INR - ( 19 Dec 2022 08:27 )   PT: 11.8 sec;   INR: 1.00 ratio         PTT - ( 19 Dec 2022 08:27 )  PTT:28.1 sec      RADIOLOGY    ANALYSIS AND PLAN:  This is a 77-year-old with episode of unresponsiveness.  For episode of unresponsiveness, as per my conversation with son and spouse at bedside, this appears to be a syncopal event.  No clear new signs on examination to suggest a new cerebrovascular accident has ensued.  Examination is nonfocal and no history to suggest epilepsy.  For orthostatics, Cardiology followup, adjustments of medications as needed.  IV fluids.  CT imaging noted, suspect these are incidental findings.  No prior history of stroke on examination, the patient is already on aspirin and anti-cholesterol medications.  For diabetes, strict control of blood sugars.  For hyperlipidemia, continue the patient on statin.  spoke to staff patient able to walk well   called son no response     Greater than 45 minutes of time was spent with the patient, plan of care, reviewing data, with greater than 50% of the visit was spent counseling and/or coordinating care with multidisciplinary healthcare team  
Patient is a 77y old  Male who presents with a chief complaint of s/p fall and syncope (21 Dec 2022 11:53)      INTERVAL HPI/OVERNIGHT EVENTS:overnight events noted    Home Medications:  finasteride 5 mg oral tablet: 1 tab(s) orally once a day (19 Dec 2022 18:07)  gabapentin 300 mg oral tablet: 300 milligram(s) orally once a day (19 Dec 2022 18:07)  metFORMIN: 1000 milligram(s) orally (19 Dec 2022 18:07)  Percocet 5/325 oral tablet:  (19 Dec 2022 18:07)  simvastatin 40 mg oral tablet: 1 tab(s) orally once a day (at bedtime) (19 Dec 2022 18:07)  tamsulosin 0.4 mg oral capsule: 1 cap(s) orally once a day (19 Dec 2022 18:07)      MEDICATIONS  (STANDING):  amoxicillin  875 milliGRAM(s)/clavulanate 1 Tablet(s) Oral two times a day  dextrose 5%. 1000 milliLiter(s) (100 mL/Hr) IV Continuous <Continuous>  dextrose 5%. 1000 milliLiter(s) (50 mL/Hr) IV Continuous <Continuous>  dextrose 50% Injectable 25 Gram(s) IV Push once  dextrose 50% Injectable 12.5 Gram(s) IV Push once  dextrose 50% Injectable 25 Gram(s) IV Push once  enoxaparin Injectable 40 milliGRAM(s) SubCutaneous every 24 hours  finasteride 5 milliGRAM(s) Oral daily  gabapentin 300 milliGRAM(s) Oral two times a day  glucagon  Injectable 1 milliGRAM(s) IntraMuscular once  insulin lispro (ADMELOG) corrective regimen sliding scale   SubCutaneous three times a day before meals  insulin lispro (ADMELOG) corrective regimen sliding scale   SubCutaneous at bedtime  lactobacillus acidophilus 1 Tablet(s) Oral two times a day with meals  metFORMIN 1000 milliGRAM(s) Oral two times a day  simvastatin 40 milliGRAM(s) Oral at bedtime  tamsulosin 0.4 milliGRAM(s) Oral at bedtime    MEDICATIONS  (PRN):  acetaminophen     Tablet .. 650 milliGRAM(s) Oral every 6 hours PRN Temp greater or equal to 38C (100.4F), Mild Pain (1 - 3)  dextrose Oral Gel 15 Gram(s) Oral once PRN Blood Glucose LESS THAN 70 milliGRAM(s)/deciliter  oxycodone    5 mG/acetaminophen 325 mG 1 Tablet(s) Oral every 6 hours PRN Severe Pain (7 - 10)      Allergies    No Known Allergies    Intolerances        REVIEW OF SYSTEMS:  CONSTITUTIONAL: No fever, weight loss, or fatigue  EYES: No eye pain, visual disturbances, or discharge  ENMT:  No difficulty hearing, tinnitus, vertigo; No sinus or throat pain  NECK: No pain or stiffness  BREASTS: No pain, masses, or nipple discharge  RESPIRATORY: No cough, wheezing, chills or hemoptysis; No shortness of breath  CARDIOVASCULAR: No chest pain, palpitations, dizziness, or leg swelling  GASTROINTESTINAL: No abdominal or epigastric pain. No nausea, vomiting, or hematemesis; NEUROLOGICAL: No headaches, memory loss, loss of strength, numbness, or tremors  SKIN: No itching, burning, rashes, or lesions   LYMPH NODES: No enlarged glands  ENDOCRINE: No heat or cold intolerance; No hair loss  MUSCULOSKELETAL: No joint pain or swelling; No muscle, back, or extremity pain  PSYCHIATRIC: No depression, anxiety, mood swings, or difficulty sleeping  HEME/LYMPH: No easy bruising, or bleeding gums  ALLERGY AND IMMUNOLOGIC: No hives or eczema    Vital Signs Last 24 Hrs  T(C): 36.7 (21 Dec 2022 07:51), Max: 36.9 (20 Dec 2022 16:02)  T(F): 98.1 (21 Dec 2022 07:51), Max: 98.4 (20 Dec 2022 16:02)  HR: 75 (21 Dec 2022 07:51) (75 - 89)  BP: 149/82 (21 Dec 2022 07:51) (115/70 - 150/79)  BP(mean): --  RR: 18 (21 Dec 2022 07:51) (18 - 18)  SpO2: 97% (21 Dec 2022 07:51) (92% - 97%)    Parameters below as of 21 Dec 2022 07:51  Patient On (Oxygen Delivery Method): room air        PHYSICAL EXAM:  GENERAL: well-groomed, well-developed  HEAD:  Atraumatic, Normocephalic  EYES: EOMI, PERRLA, conjunctiva and sclera clear  ENMT: Moist mucous membranes,   NECK: Supple, No JVD, Normal thyroid  NERVOUS SYSTEM:  Alert & Oriented X3, non focal  CHEST/LUNG: Clear to percussion bilaterally; No rales, rhonchi, wheezing, or rubs  HEART: Regular rate and rhythm; No murmurs, rubs, or gallops  ABDOMEN: Soft, Nontender, Nondistended; Bowel sounds present  EXTREMITIES:  2+ Peripheral Pulses, No clubbing, cyanosis, or edema        LABS:                        14.0   7.37  )-----------( 225      ( 21 Dec 2022 06:00 )             41.9     12-21    133<L>  |  99  |  21  ----------------------------<  208<H>  4.2   |  24  |  1.32<H>    Ca    9.0      21 Dec 2022 06:00    TPro  7.7  /  Alb  3.8  /  TBili  0.5  /  DBili  x   /  AST  19  /  ALT  19  /  AlkPhos  126<H>  12-21        CAPILLARY BLOOD GLUCOSE      POCT Blood Glucose.: 102 mg/dL (21 Dec 2022 12:08)  POCT Blood Glucose.: 141 mg/dL (21 Dec 2022 07:43)  POCT Blood Glucose.: 86 mg/dL (20 Dec 2022 20:59)  POCT Blood Glucose.: 138 mg/dL (20 Dec 2022 16:45)        Culture - Urine (collected 12-19-22 @ 22:38)  Source: Clean Catch Clean Catch (Midstream)  Final Report (12-21-22 @ 11:12):    <10,000 CFU/mL Normal Urogenital Maru        I&O's Summary      RADIOLOGY & ADDITIONAL TESTS:    Imaging Personally Reviewed:  [x ] YES  [ ] NO    Consultant(s) Notes Reviewed:  [ x] YES  [ ] NO    Care Discussed with Consultants/Other Providers [ x] YES  [ ] NO

## 2022-12-21 NOTE — DISCHARGE NOTE NURSING/CASE MANAGEMENT/SOCIAL WORK - NSDCPEFALRISK_GEN_ALL_CORE
For information on Fall & Injury Prevention, visit: [email or web address] OR  [email or web address] OR  https://www.cdc.gov/steadi/patient.html

## 2023-01-04 PROCEDURE — 80061 LIPID PANEL: CPT

## 2023-01-04 PROCEDURE — 84484 ASSAY OF TROPONIN QUANT: CPT

## 2023-01-04 PROCEDURE — 90471 IMMUNIZATION ADMIN: CPT

## 2023-01-04 PROCEDURE — 85610 PROTHROMBIN TIME: CPT

## 2023-01-04 PROCEDURE — 71045 X-RAY EXAM CHEST 1 VIEW: CPT

## 2023-01-04 PROCEDURE — 82962 GLUCOSE BLOOD TEST: CPT

## 2023-01-04 PROCEDURE — 73110 X-RAY EXAM OF WRIST: CPT

## 2023-01-04 PROCEDURE — 90715 TDAP VACCINE 7 YRS/> IM: CPT

## 2023-01-04 PROCEDURE — 85027 COMPLETE CBC AUTOMATED: CPT

## 2023-01-04 PROCEDURE — 96365 THER/PROPH/DIAG IV INF INIT: CPT

## 2023-01-04 PROCEDURE — 81001 URINALYSIS AUTO W/SCOPE: CPT

## 2023-01-04 PROCEDURE — 85730 THROMBOPLASTIN TIME PARTIAL: CPT

## 2023-01-04 PROCEDURE — 72125 CT NECK SPINE W/O DYE: CPT | Mod: MA

## 2023-01-04 PROCEDURE — 36415 COLL VENOUS BLD VENIPUNCTURE: CPT

## 2023-01-04 PROCEDURE — 87637 SARSCOV2&INF A&B&RSV AMP PRB: CPT

## 2023-01-04 PROCEDURE — 85025 COMPLETE CBC W/AUTO DIFF WBC: CPT

## 2023-01-04 PROCEDURE — 73080 X-RAY EXAM OF ELBOW: CPT

## 2023-01-04 PROCEDURE — 83036 HEMOGLOBIN GLYCOSYLATED A1C: CPT

## 2023-01-04 PROCEDURE — 70486 CT MAXILLOFACIAL W/O DYE: CPT | Mod: MA

## 2023-01-04 PROCEDURE — 80053 COMPREHEN METABOLIC PANEL: CPT

## 2023-01-04 PROCEDURE — 99285 EMERGENCY DEPT VISIT HI MDM: CPT

## 2023-01-04 PROCEDURE — 97161 PT EVAL LOW COMPLEX 20 MIN: CPT

## 2023-01-04 PROCEDURE — 87086 URINE CULTURE/COLONY COUNT: CPT

## 2023-01-04 PROCEDURE — 93005 ELECTROCARDIOGRAM TRACING: CPT

## 2023-01-04 PROCEDURE — 70450 CT HEAD/BRAIN W/O DYE: CPT | Mod: MA

## 2023-07-26 NOTE — ED PROVIDER NOTE - CPE EDP MUS[facility] NORM
normal...

## 2023-09-15 NOTE — ED PROVIDER NOTE - CARE PLAN
Patient: Allyn Lou  : 1977    Primary Care Provider: Enedina Parker DO    Requesting Provider: As above        History    Chief Complaint: Low back and bilateral hip pain.    History of Present Illness: Ms. Lou a 46-year-old  who since August has had bothersome low back pain that extends into both hips and occasionally into the side of her right thigh.  She denies any inciting or precipitating event.  She is believed to be symptomatic from mechanical low back pain.  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  She has had some urinary incontinence but denies any perineal sensory change.  She has some discomfort in the side of her left thigh.    Review of Systems   Constitutional:  Negative for activity change, appetite change, chills, diaphoresis, fatigue, fever and unexpected weight change.   HENT:  Negative for congestion, dental problem, drooling, ear discharge, ear pain, facial swelling, hearing loss, mouth sores, nosebleeds, postnasal drip, rhinorrhea, sinus pressure, sneezing, sore throat, tinnitus, trouble swallowing and voice change.    Eyes:  Negative for photophobia, pain, discharge, redness, itching and visual disturbance.   Respiratory:  Negative for apnea, cough, choking, chest tightness, shortness of breath, wheezing and stridor.    Cardiovascular:  Negative for chest pain, palpitations and leg swelling.   Gastrointestinal:  Negative for abdominal distention, abdominal pain, anal bleeding, blood in stool, constipation, diarrhea, nausea, rectal pain and vomiting.   Endocrine: Negative for cold intolerance, heat intolerance, polydipsia, polyphagia and polyuria.   Genitourinary:  Negative for decreased urine volume, difficulty urinating, dyspareunia, dysuria, enuresis, flank pain, frequency, genital sores, hematuria, menstrual problem, pelvic pain, urgency,  "vaginal bleeding, vaginal discharge and vaginal pain.   Musculoskeletal:  Negative for arthralgias, back pain, gait problem, joint swelling, myalgias, neck pain and neck stiffness.   Skin:  Negative for color change, pallor, rash and wound.   Allergic/Immunologic: Negative for environmental allergies, food allergies and immunocompromised state.   Neurological:  Negative for dizziness, tremors, seizures, syncope, facial asymmetry, speech difficulty, weakness, light-headedness, numbness and headaches.   Hematological:  Negative for adenopathy. Does not bruise/bleed easily.   Psychiatric/Behavioral:  Negative for agitation, behavioral problems, confusion, decreased concentration, dysphoric mood, hallucinations, self-injury, sleep disturbance and suicidal ideas. The patient is not nervous/anxious and is not hyperactive.    All other systems reviewed and are negative.    The patient's past medical history, past surgical history, family history, and social history have been reviewed at length in the electronic medical record.      Physical Exam:   Ht 160 cm (63\")   Wt 74.9 kg (165 lb 3.2 oz)   BMI 29.26 kg/m²   Furred    Medical Decision Making    Data Review:   (All imaging studies were personally reviewed unless stated otherwise)  Follow-up MRI of the lumbar spine dated 9/8/2023 is compared to the study from 12/16 test 22.  The right S2 homogeneously enhancing rounded lesion anterior to the sacrum is unchanged on the studies.  She does have some degenerative disc disease as well.    Diagnosis:   1.  Mechanical low back pain.  2.  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    Treatment Options:   The patient will follow-up in 1 year with a new MRI of the lumbar spine with and without gadolinium to follow her tumor.  Treatment for her mechanical back pain should remain symptomatic.       Diagnosis Plan   1. Nerve sheath tumor            Scribed for Angelito Caba MD by NIKHIL Jackson " 9/15/2023 12:28 EDT      I, Dr. Caba, personally performed the services described in the documentation, as scribed in my presence, and it is both accurate and complete.   Principal Discharge DX:	Parotitis   Principal Discharge DX:	Parotitis  Secondary Diagnosis:	Cellulitis  Secondary Diagnosis:	Sepsis

## 2024-03-16 ENCOUNTER — INPATIENT (INPATIENT)
Facility: HOSPITAL | Age: 79
LOS: 2 days | Discharge: HOME | DRG: 392 | End: 2024-03-19
Attending: STUDENT IN AN ORGANIZED HEALTH CARE EDUCATION/TRAINING PROGRAM | Admitting: STUDENT IN AN ORGANIZED HEALTH CARE EDUCATION/TRAINING PROGRAM
Payer: MEDICARE

## 2024-03-16 VITALS
SYSTOLIC BLOOD PRESSURE: 140 MMHG | RESPIRATION RATE: 16 BRPM | TEMPERATURE: 98 F | OXYGEN SATURATION: 99 % | WEIGHT: 190.04 LBS | HEIGHT: 68 IN | HEART RATE: 83 BPM | DIASTOLIC BLOOD PRESSURE: 96 MMHG

## 2024-03-16 DIAGNOSIS — K81.0 ACUTE CHOLECYSTITIS: ICD-10-CM

## 2024-03-16 PROBLEM — E78.00 PURE HYPERCHOLESTEROLEMIA, UNSPECIFIED: Chronic | Status: ACTIVE | Noted: 2022-12-19

## 2024-03-16 PROBLEM — E11.9 TYPE 2 DIABETES MELLITUS WITHOUT COMPLICATIONS: Chronic | Status: ACTIVE | Noted: 2022-12-19

## 2024-03-16 LAB
ALBUMIN SERPL ELPH-MCNC: 4.2 G/DL — SIGNIFICANT CHANGE UP (ref 3.3–5)
ALP SERPL-CCNC: 130 U/L — HIGH (ref 30–120)
ALT FLD-CCNC: 23 U/L — SIGNIFICANT CHANGE UP (ref 10–60)
ANION GAP SERPL CALC-SCNC: 12 MMOL/L — SIGNIFICANT CHANGE UP (ref 5–17)
APPEARANCE UR: CLEAR — SIGNIFICANT CHANGE UP
APTT BLD: 30.1 SEC — SIGNIFICANT CHANGE UP (ref 24.5–35.6)
AST SERPL-CCNC: 17 U/L — SIGNIFICANT CHANGE UP (ref 10–40)
BASOPHILS # BLD AUTO: 0.05 K/UL — SIGNIFICANT CHANGE UP (ref 0–0.2)
BASOPHILS NFR BLD AUTO: 0.5 % — SIGNIFICANT CHANGE UP (ref 0–2)
BILIRUB SERPL-MCNC: 0.5 MG/DL — SIGNIFICANT CHANGE UP (ref 0.2–1.2)
BILIRUB UR-MCNC: NEGATIVE — SIGNIFICANT CHANGE UP
BUN SERPL-MCNC: 19 MG/DL — SIGNIFICANT CHANGE UP (ref 7–23)
CALCIUM SERPL-MCNC: 9.1 MG/DL — SIGNIFICANT CHANGE UP (ref 8.4–10.5)
CHLORIDE SERPL-SCNC: 96 MMOL/L — SIGNIFICANT CHANGE UP (ref 96–108)
CO2 SERPL-SCNC: 24 MMOL/L — SIGNIFICANT CHANGE UP (ref 22–31)
COLOR SPEC: YELLOW — SIGNIFICANT CHANGE UP
CREAT SERPL-MCNC: 1.05 MG/DL — SIGNIFICANT CHANGE UP (ref 0.5–1.3)
D DIMER BLD IA.RAPID-MCNC: 334 NG/ML DDU — HIGH
DIFF PNL FLD: NEGATIVE — SIGNIFICANT CHANGE UP
EGFR: 72 ML/MIN/1.73M2 — SIGNIFICANT CHANGE UP
EOSINOPHIL # BLD AUTO: 0.1 K/UL — SIGNIFICANT CHANGE UP (ref 0–0.5)
EOSINOPHIL NFR BLD AUTO: 1 % — SIGNIFICANT CHANGE UP (ref 0–6)
GLUCOSE BLDC GLUCOMTR-MCNC: 127 MG/DL — HIGH (ref 70–99)
GLUCOSE BLDC GLUCOMTR-MCNC: 206 MG/DL — HIGH (ref 70–99)
GLUCOSE SERPL-MCNC: 188 MG/DL — HIGH (ref 70–99)
GLUCOSE UR QL: >=1000 MG/DL
HCT VFR BLD CALC: 41.2 % — SIGNIFICANT CHANGE UP (ref 39–50)
HGB BLD-MCNC: 13.8 G/DL — SIGNIFICANT CHANGE UP (ref 13–17)
IMM GRANULOCYTES NFR BLD AUTO: 0.2 % — SIGNIFICANT CHANGE UP (ref 0–0.9)
INR BLD: 0.95 RATIO — SIGNIFICANT CHANGE UP (ref 0.85–1.18)
KETONES UR-MCNC: NEGATIVE MG/DL — SIGNIFICANT CHANGE UP
LEUKOCYTE ESTERASE UR-ACNC: NEGATIVE — SIGNIFICANT CHANGE UP
LYMPHOCYTES # BLD AUTO: 2.02 K/UL — SIGNIFICANT CHANGE UP (ref 1–3.3)
LYMPHOCYTES # BLD AUTO: 21.2 % — SIGNIFICANT CHANGE UP (ref 13–44)
MCHC RBC-ENTMCNC: 29.9 PG — SIGNIFICANT CHANGE UP (ref 27–34)
MCHC RBC-ENTMCNC: 33.5 GM/DL — SIGNIFICANT CHANGE UP (ref 32–36)
MCV RBC AUTO: 89.4 FL — SIGNIFICANT CHANGE UP (ref 80–100)
MONOCYTES # BLD AUTO: 0.7 K/UL — SIGNIFICANT CHANGE UP (ref 0–0.9)
MONOCYTES NFR BLD AUTO: 7.3 % — SIGNIFICANT CHANGE UP (ref 2–14)
NEUTROPHILS # BLD AUTO: 6.64 K/UL — SIGNIFICANT CHANGE UP (ref 1.8–7.4)
NEUTROPHILS NFR BLD AUTO: 69.8 % — SIGNIFICANT CHANGE UP (ref 43–77)
NITRITE UR-MCNC: NEGATIVE — SIGNIFICANT CHANGE UP
NRBC # BLD: 0 /100 WBCS — SIGNIFICANT CHANGE UP (ref 0–0)
PH UR: 7.5 — SIGNIFICANT CHANGE UP (ref 5–8)
PLATELET # BLD AUTO: 254 K/UL — SIGNIFICANT CHANGE UP (ref 150–400)
POTASSIUM SERPL-MCNC: 4.2 MMOL/L — SIGNIFICANT CHANGE UP (ref 3.5–5.3)
POTASSIUM SERPL-SCNC: 4.2 MMOL/L — SIGNIFICANT CHANGE UP (ref 3.5–5.3)
PROT SERPL-MCNC: 7.9 G/DL — SIGNIFICANT CHANGE UP (ref 6–8.3)
PROT UR-MCNC: NEGATIVE MG/DL — SIGNIFICANT CHANGE UP
PROTHROM AB SERPL-ACNC: 10.7 SEC — SIGNIFICANT CHANGE UP (ref 9.5–13)
RBC # BLD: 4.61 M/UL — SIGNIFICANT CHANGE UP (ref 4.2–5.8)
RBC # FLD: 11.9 % — SIGNIFICANT CHANGE UP (ref 10.3–14.5)
SODIUM SERPL-SCNC: 132 MMOL/L — LOW (ref 135–145)
SP GR SPEC: 1.02 — SIGNIFICANT CHANGE UP (ref 1–1.03)
TROPONIN I, HIGH SENSITIVITY RESULT: <4 NG/L — SIGNIFICANT CHANGE UP
UROBILINOGEN FLD QL: 0.2 MG/DL — SIGNIFICANT CHANGE UP (ref 0.2–1)
WBC # BLD: 9.53 K/UL — SIGNIFICANT CHANGE UP (ref 3.8–10.5)
WBC # FLD AUTO: 9.53 K/UL — SIGNIFICANT CHANGE UP (ref 3.8–10.5)

## 2024-03-16 PROCEDURE — 99223 1ST HOSP IP/OBS HIGH 75: CPT

## 2024-03-16 PROCEDURE — 99285 EMERGENCY DEPT VISIT HI MDM: CPT

## 2024-03-16 PROCEDURE — 76705 ECHO EXAM OF ABDOMEN: CPT | Mod: 26

## 2024-03-16 PROCEDURE — 74177 CT ABD & PELVIS W/CONTRAST: CPT | Mod: 26,MC

## 2024-03-16 PROCEDURE — 93010 ELECTROCARDIOGRAM REPORT: CPT

## 2024-03-16 PROCEDURE — 71045 X-RAY EXAM CHEST 1 VIEW: CPT | Mod: 26

## 2024-03-16 PROCEDURE — 71275 CT ANGIOGRAPHY CHEST: CPT | Mod: 26,MC

## 2024-03-16 RX ORDER — OXYCODONE AND ACETAMINOPHEN 5; 325 MG/1; MG/1
0 TABLET ORAL
Qty: 0 | Refills: 0 | DISCHARGE

## 2024-03-16 RX ORDER — GABAPENTIN 400 MG/1
300 CAPSULE ORAL
Refills: 0 | Status: DISCONTINUED | OUTPATIENT
Start: 2024-03-16 | End: 2024-03-19

## 2024-03-16 RX ORDER — ALFUZOSIN HYDROCHLORIDE 10 MG/1
1 TABLET, EXTENDED RELEASE ORAL
Refills: 0 | DISCHARGE

## 2024-03-16 RX ORDER — GLUCAGON INJECTION, SOLUTION 0.5 MG/.1ML
1 INJECTION, SOLUTION SUBCUTANEOUS ONCE
Refills: 0 | Status: DISCONTINUED | OUTPATIENT
Start: 2024-03-16 | End: 2024-03-19

## 2024-03-16 RX ORDER — SITAGLIPTIN 50 MG/1
1 TABLET, FILM COATED ORAL
Qty: 0 | Refills: 0 | DISCHARGE

## 2024-03-16 RX ORDER — ACETAMINOPHEN 500 MG
1000 TABLET ORAL ONCE
Refills: 0 | Status: COMPLETED | OUTPATIENT
Start: 2024-03-16 | End: 2024-03-16

## 2024-03-16 RX ORDER — METFORMIN HYDROCHLORIDE 850 MG/1
1 TABLET ORAL
Qty: 0 | Refills: 0 | DISCHARGE

## 2024-03-16 RX ORDER — TAMSULOSIN HYDROCHLORIDE 0.4 MG/1
0.8 CAPSULE ORAL AT BEDTIME
Refills: 0 | Status: DISCONTINUED | OUTPATIENT
Start: 2024-03-16 | End: 2024-03-19

## 2024-03-16 RX ORDER — SODIUM CHLORIDE 9 MG/ML
1000 INJECTION, SOLUTION INTRAVENOUS
Refills: 0 | Status: DISCONTINUED | OUTPATIENT
Start: 2024-03-16 | End: 2024-03-19

## 2024-03-16 RX ORDER — SIMVASTATIN 20 MG/1
1 TABLET, FILM COATED ORAL
Qty: 0 | Refills: 0 | DISCHARGE

## 2024-03-16 RX ORDER — METFORMIN HYDROCHLORIDE 850 MG/1
1000 TABLET ORAL
Qty: 0 | Refills: 0 | DISCHARGE

## 2024-03-16 RX ORDER — DEXTROSE 50 % IN WATER 50 %
15 SYRINGE (ML) INTRAVENOUS ONCE
Refills: 0 | Status: DISCONTINUED | OUTPATIENT
Start: 2024-03-16 | End: 2024-03-19

## 2024-03-16 RX ORDER — TAMSULOSIN HYDROCHLORIDE 0.4 MG/1
1 CAPSULE ORAL
Qty: 0 | Refills: 0 | DISCHARGE

## 2024-03-16 RX ORDER — ACETAMINOPHEN 500 MG
650 TABLET ORAL EVERY 6 HOURS
Refills: 0 | Status: DISCONTINUED | OUTPATIENT
Start: 2024-03-16 | End: 2024-03-17

## 2024-03-16 RX ORDER — FINASTERIDE 5 MG/1
1 TABLET, FILM COATED ORAL
Qty: 0 | Refills: 0 | DISCHARGE

## 2024-03-16 RX ORDER — INSULIN LISPRO 100/ML
VIAL (ML) SUBCUTANEOUS AT BEDTIME
Refills: 0 | Status: DISCONTINUED | OUTPATIENT
Start: 2024-03-16 | End: 2024-03-19

## 2024-03-16 RX ORDER — LISINOPRIL 2.5 MG/1
1 TABLET ORAL
Qty: 0 | Refills: 0 | DISCHARGE

## 2024-03-16 RX ORDER — GABAPENTIN 400 MG/1
1 CAPSULE ORAL
Qty: 0 | Refills: 0 | DISCHARGE

## 2024-03-16 RX ORDER — GABAPENTIN 400 MG/1
2 CAPSULE ORAL
Refills: 0 | DISCHARGE

## 2024-03-16 RX ORDER — SODIUM CHLORIDE 9 MG/ML
1000 INJECTION INTRAMUSCULAR; INTRAVENOUS; SUBCUTANEOUS ONCE
Refills: 0 | Status: COMPLETED | OUTPATIENT
Start: 2024-03-16 | End: 2024-03-16

## 2024-03-16 RX ORDER — DEXTROSE 50 % IN WATER 50 %
25 SYRINGE (ML) INTRAVENOUS ONCE
Refills: 0 | Status: DISCONTINUED | OUTPATIENT
Start: 2024-03-16 | End: 2024-03-19

## 2024-03-16 RX ORDER — METFORMIN HYDROCHLORIDE 850 MG/1
1000 TABLET ORAL
Refills: 0 | Status: DISCONTINUED | OUTPATIENT
Start: 2024-03-16 | End: 2024-03-17

## 2024-03-16 RX ORDER — DEXTROSE 50 % IN WATER 50 %
12.5 SYRINGE (ML) INTRAVENOUS ONCE
Refills: 0 | Status: DISCONTINUED | OUTPATIENT
Start: 2024-03-16 | End: 2024-03-19

## 2024-03-16 RX ORDER — GABAPENTIN 400 MG/1
600 CAPSULE ORAL
Refills: 0 | Status: DISCONTINUED | OUTPATIENT
Start: 2024-03-16 | End: 2024-03-19

## 2024-03-16 RX ORDER — OXYCODONE HYDROCHLORIDE 5 MG/1
2.5 TABLET ORAL EVERY 6 HOURS
Refills: 0 | Status: DISCONTINUED | OUTPATIENT
Start: 2024-03-16 | End: 2024-03-17

## 2024-03-16 RX ORDER — SIMVASTATIN 20 MG/1
40 TABLET, FILM COATED ORAL AT BEDTIME
Refills: 0 | Status: DISCONTINUED | OUTPATIENT
Start: 2024-03-16 | End: 2024-03-19

## 2024-03-16 RX ORDER — GABAPENTIN 400 MG/1
300 CAPSULE ORAL
Qty: 0 | Refills: 0 | DISCHARGE

## 2024-03-16 RX ORDER — INSULIN LISPRO 100/ML
VIAL (ML) SUBCUTANEOUS
Refills: 0 | Status: DISCONTINUED | OUTPATIENT
Start: 2024-03-16 | End: 2024-03-19

## 2024-03-16 RX ADMIN — TAMSULOSIN HYDROCHLORIDE 0.8 MILLIGRAM(S): 0.4 CAPSULE ORAL at 22:03

## 2024-03-16 RX ADMIN — OXYCODONE HYDROCHLORIDE 2.5 MILLIGRAM(S): 5 TABLET ORAL at 23:10

## 2024-03-16 RX ADMIN — Medication 1000 MILLIGRAM(S): at 15:12

## 2024-03-16 RX ADMIN — SODIUM CHLORIDE 1000 MILLILITER(S): 9 INJECTION INTRAMUSCULAR; INTRAVENOUS; SUBCUTANEOUS at 14:42

## 2024-03-16 RX ADMIN — Medication 650 MILLIGRAM(S): at 23:35

## 2024-03-16 RX ADMIN — SODIUM CHLORIDE 1000 MILLILITER(S): 9 INJECTION INTRAMUSCULAR; INTRAVENOUS; SUBCUTANEOUS at 15:42

## 2024-03-16 RX ADMIN — SIMVASTATIN 40 MILLIGRAM(S): 20 TABLET, FILM COATED ORAL at 22:04

## 2024-03-16 RX ADMIN — Medication 400 MILLIGRAM(S): at 14:42

## 2024-03-16 RX ADMIN — OXYCODONE HYDROCHLORIDE 2.5 MILLIGRAM(S): 5 TABLET ORAL at 22:13

## 2024-03-16 RX ADMIN — Medication 1000 MILLIGRAM(S): at 15:00

## 2024-03-16 NOTE — ED ADULT NURSE NOTE - OBJECTIVE STATEMENT
BIBA with c/o chest pain that started this morning. Denies falling, SOB, n/v/d and is afebrile. Pt reports pain score via  7-8/10. History of diabetes.

## 2024-03-16 NOTE — ED PROVIDER NOTE - PROGRESS NOTE DETAILS
Labs revealed mildly elevated alk phos otherwise normal.  CT revealed inflamed gallbladder with pericholecystic fluid.  Ultrasound gallbladder pending.  I spoke with surgery Dr. Mera who suggested admit to medicine for further evaluation and treatment.

## 2024-03-16 NOTE — CONSULT NOTE ADULT - ASSESSMENT
I discussed the pt. with ER attending.  History reviewed , so were ct scan and abd sono. the last test which is more specific for GB , did specifically say that acute cholecystitis is unlikely.  Changes more likely consistent with adenomyomatosis, which could be seen in association with biliary dyskinesia. Pt's back pain may also be related to to his back issues.  When available would get HIDA with cck.  Will follow.

## 2024-03-16 NOTE — ED ADULT NURSE NOTE - NSFALLUNIVINTERV_ED_ALL_ED
Bed/Stretcher in lowest position, wheels locked, appropriate side rails in place/Call bell, personal items and telephone in reach/Instruct patient to call for assistance before getting out of bed/chair/stretcher/Non-slip footwear applied when patient is off stretcher/[place] to call system/Physically safe environment - no spills, clutter or unnecessary equipment/Purposeful proactive rounding/Room/bathroom lighting operational, light cord in reach

## 2024-03-16 NOTE — ED PROVIDER NOTE - SKIN NEGATIVE STATEMENT, MLM
No office visit. CL order only.    Contact Lens Billing  V-Code:  - CL, other  Final Contact Lens Rx       Brand Base Curve Diameter Sphere Lens    Right [place] Tinted Annular 8.3 14.5 +11.50 [place] #4, 4mm pupil clear    Left              # of units: 1  Price per Unit: $100    This patient requires contact lenses that are medically necessary for either improvement in vision over spectacles, support of the ocular surface, or other therapeutic benefit. These are not cosmetic contact lenses.  Reduction in light sensitivity    Encounter Diagnoses   Name Primary?     Aphakia, right - Right Eye Yes     Traumatic mydriasis      Photophobia of right eye         Date of last eye exam: 7/11/17   no abrasions, no jaundice, no lesions, no pruritis, and no rashes.

## 2024-03-16 NOTE — H&P ADULT - ASSESSMENT
79M with PMHx of T2DM, prior back surgeries (on gabapentin for neuropathy), and BPH presenting for right flank pain.  In the ED concerned for possible PE so he had CTA Chest/Abd/Pelvis. No PE seen, but incidentally found to have thick-walled gallbladder with mild adjacent inflammation. Patient had RUQ sono which showed: "Marked wall thickening of the gallbladder fundus, likely representing adenomyomatosis or third spacing." Patient admitted for further management of right flank pain.    #Right Flank Pain  - Doubt UTI/pyelo, UA without pyuria  - Doubt cholecystitis--w/o fever or leukocytosis or transaminitis. NO RUQ pain. RUQ sono not concerning. Surgery consulted (monitor off abx for now)  - Doubt cardiac--trop neg  - Possible MSK? Tylenol PRN with lidocaine patch    #T2DM  - Hold home metformin and Januvia  - FS TID AC & insulin sliding scale    #BPH  - Takes Alfuzosin 10 mg qhs  - Start Flomax 0.8 mg qhs

## 2024-03-16 NOTE — ED PROVIDER NOTE - CLINICAL SUMMARY MEDICAL DECISION MAKING FREE TEXT BOX
79-year-old male with history of chronic pain neuropathy multiple spinal surgeries complaining of right lateral chest and flank pain since 6 AM this morning.  Denies fever cough shortness of breath nausea vomiting diarrhea dysuria hematuria or other symptom.  States pain is worse when he takes a deep breath.  Patient speaks only Armenian and interpretive services were used for interview.    Physical exam is unrevealing.  Impression is right chest/flank pain pleuritic in nature.  Rule out PE rule out pneumonia rule out kidney stone rule out rib strain.  Plan labs urine CT pain meds and reassess.

## 2024-03-16 NOTE — PATIENT PROFILE ADULT - FALL HARM RISK - HARM RISK INTERVENTIONS

## 2024-03-16 NOTE — ED PROVIDER NOTE - SKIN, MLM
Skin normal color for race, warm, dry and intact. No evidence of rash. Well-healed surgical scar over posterior cervical spine and posterior lumbar spine.

## 2024-03-16 NOTE — ED PROVIDER NOTE - NSICDXPASTMEDICALHX_GEN_ALL_CORE_FT
PAST MEDICAL HISTORY:  Diabetes mellitus     DM (diabetes mellitus)     DM (diabetes mellitus)     High cholesterol     HLD (hyperlipidemia)     HTN (hypertension)     Hyperlipidemia     Hypertension

## 2024-03-16 NOTE — ED PROVIDER NOTE - OBJECTIVE STATEMENT
79-year-old male with history of chronic pain neuropathy multiple spinal surgeries complaining of right lateral chest and flank pain since 6 AM this morning.  Denies fever cough shortness of breath nausea vomiting diarrhea dysuria hematuria or other symptom.  States pain is worse when he takes a deep breath.  Patient speaks only Albanian and interpretive services were used for interview.

## 2024-03-16 NOTE — ED ADULT TRIAGE NOTE - ESI TRIAGE ACUITY LEVEL, MLM
Date: 10/11/2017  PCP: No primary care provider on file.    Chief Complaint   Patient presents with   • Thumb     sprained right thumb when she fell 10- , swollen, bruised, some movements feel super tight.    • Imm/Inj     HPV       HPI: Ruchi Morris is a 19 year old year old female presenting to clinic with complaints of right thumb swelling and pain.    Patient is a right-handed female. States that on 10/8/2017 she ran, tripped and fell landing on her outstretched right thumb. Reports associated swelling, pain, describes pain as sharp in character, constant. Also reports bruising. Patient has been analgesia with some relief of pain.     No other concerns today.      Complete review of systems completed and is otherwise negative except for those mentioned in the history of presenting complaints   Past Medical Hx, Surgical Hx, Social Hx and Allergies were all reviewed and updated within EPIC.    MEDS:  Current Outpatient Prescriptions   Medication Sig   • pramipexole (MIRAPEX) 0.125 MG tablet Take 1 tablet 2-3 hours before bedtime     No current facility-administered medications for this visit.      PHYSICAL EXAM:  Visit Vitals  /64 [place] [facility] [place]: [place]itting, Cuff Size: Regular)   Pulse 84   Temp 98.7 °F (37.1 °C) (Temporal Artery)   Ht 5' 2\" (1.575 m)   Wt 74.4 kg   BMI 30.00 kg/m²     General: no acute distress, well hydrated, well groomed  CV: RRR, normal S1/S2, no murmurs/rubs/gallops, non-displaced PMI  Resp: RR- 13, good air entry, CTAB, no wheeze/rales/rhonchi  Abd: soft, NT/ND, normoactive bowel sounds, no rebound or guarding, no hepatosplenomegaly  Ext: R hand: R thumb swelling, ecchymosis, tender to palpation   Psych: mood and affect appropriate    ASSESSMENT & PLAN:  This patient is a 19 year old female presenting with:    R thumb sprain:  - Ibuprofen 800 mg tid  - Discussed signs and symptoms that would warrant immediate evaluation    - Possible XR if no improvement of  symptoms     Health Maintenance:  Immunizations - HPV vaccine given today     FOLLOW-UP:  Patient is to return as scheduled or sooner as needed     Thomas Colmenares MD  10/11/2017     2

## 2024-03-16 NOTE — ED PROVIDER NOTE - MUSCULOSKELETAL, MLM
Spine appears normal, range of motion is not limited, no muscle or joint tenderness. There is no rib tenderness or deformity.

## 2024-03-16 NOTE — H&P ADULT - HISTORY OF PRESENT ILLNESS
Marked wall thickening of the gallbladder fundus, likely representing   adenomyomatosis or third spacing. Cholecystitis is considered unlikely.    Normal right kidney without evidence of hydronephrosis.    Diffuse hepatic steatosis.    Thick-walled gallbladder with mild adjacent inflammation.     79-year-old male with history of chronic pain neuropathy multiple spinal surgeries complaining of right lateral chest and flank pain since 6 AM this morning.  Denies fever cough shortness of breath nausea vomiting diarrhea dysuria hematuria or other symptom.  States pain is worse when he takes a deep breath.  Patient speaks only Hungarian and interpretive services were used for interview. 79M with PMHx of T2DM, prior back surgeries (on gabapentin for neuropathy), and BPH presenting for right flank pain. Patient states pain started on the day of admission. Located in the back near his right floating rib. Denies trauma. He states pain is worse when he takes in a deep breath and is positional. Denies fever, chills, nausea, or vomiting. No relation to food. Unchanged bowel habits, states he's constipated. In the ED concerned for possible PE so he had CTA Chest/Abd/Pelvis. No PE seen, but incidentally found to have thick-walled gallbladder with mild adjacent inflammation. Patient had RUQ sono which showed: "Marked wall thickening of the gallbladder fundus, likely representing adenomyomatosis or third spacing. Cholecystitis is considered unlikely." In the ED he was given 1 L NS and Tylenol.

## 2024-03-17 LAB
A1C WITH ESTIMATED AVERAGE GLUCOSE RESULT: 8.1 % — HIGH (ref 4–5.6)
ALBUMIN SERPL ELPH-MCNC: 4 G/DL — SIGNIFICANT CHANGE UP (ref 3.3–5)
ALP SERPL-CCNC: 122 U/L — HIGH (ref 30–120)
ALT FLD-CCNC: 26 U/L — SIGNIFICANT CHANGE UP (ref 10–60)
ANION GAP SERPL CALC-SCNC: 5 MMOL/L — SIGNIFICANT CHANGE UP (ref 5–17)
AST SERPL-CCNC: 16 U/L — SIGNIFICANT CHANGE UP (ref 10–40)
BILIRUB SERPL-MCNC: 0.9 MG/DL — SIGNIFICANT CHANGE UP (ref 0.2–1.2)
BUN SERPL-MCNC: 18 MG/DL — SIGNIFICANT CHANGE UP (ref 7–23)
CALCIUM SERPL-MCNC: 8.9 MG/DL — SIGNIFICANT CHANGE UP (ref 8.4–10.5)
CHLORIDE SERPL-SCNC: 97 MMOL/L — SIGNIFICANT CHANGE UP (ref 96–108)
CO2 SERPL-SCNC: 27 MMOL/L — SIGNIFICANT CHANGE UP (ref 22–31)
CREAT SERPL-MCNC: 1.03 MG/DL — SIGNIFICANT CHANGE UP (ref 0.5–1.3)
EGFR: 73 ML/MIN/1.73M2 — SIGNIFICANT CHANGE UP
ESTIMATED AVERAGE GLUCOSE: 186 MG/DL — HIGH (ref 68–114)
GLUCOSE BLDC GLUCOMTR-MCNC: 170 MG/DL — HIGH (ref 70–99)
GLUCOSE BLDC GLUCOMTR-MCNC: 185 MG/DL — HIGH (ref 70–99)
GLUCOSE BLDC GLUCOMTR-MCNC: 189 MG/DL — HIGH (ref 70–99)
GLUCOSE BLDC GLUCOMTR-MCNC: 219 MG/DL — HIGH (ref 70–99)
GLUCOSE SERPL-MCNC: 179 MG/DL — HIGH (ref 70–99)
HCT VFR BLD CALC: 41.7 % — SIGNIFICANT CHANGE UP (ref 39–50)
HGB BLD-MCNC: 13.9 G/DL — SIGNIFICANT CHANGE UP (ref 13–17)
MCHC RBC-ENTMCNC: 30 PG — SIGNIFICANT CHANGE UP (ref 27–34)
MCHC RBC-ENTMCNC: 33.3 GM/DL — SIGNIFICANT CHANGE UP (ref 32–36)
MCV RBC AUTO: 90.1 FL — SIGNIFICANT CHANGE UP (ref 80–100)
NRBC # BLD: 0 /100 WBCS — SIGNIFICANT CHANGE UP (ref 0–0)
PLATELET # BLD AUTO: 239 K/UL — SIGNIFICANT CHANGE UP (ref 150–400)
POTASSIUM SERPL-MCNC: 4.6 MMOL/L — SIGNIFICANT CHANGE UP (ref 3.5–5.3)
POTASSIUM SERPL-SCNC: 4.6 MMOL/L — SIGNIFICANT CHANGE UP (ref 3.5–5.3)
PROT SERPL-MCNC: 7.3 G/DL — SIGNIFICANT CHANGE UP (ref 6–8.3)
RBC # BLD: 4.63 M/UL — SIGNIFICANT CHANGE UP (ref 4.2–5.8)
RBC # FLD: 12 % — SIGNIFICANT CHANGE UP (ref 10.3–14.5)
SODIUM SERPL-SCNC: 129 MMOL/L — LOW (ref 135–145)
WBC # BLD: 8.3 K/UL — SIGNIFICANT CHANGE UP (ref 3.8–10.5)
WBC # FLD AUTO: 8.3 K/UL — SIGNIFICANT CHANGE UP (ref 3.8–10.5)

## 2024-03-17 PROCEDURE — 99232 SBSQ HOSP IP/OBS MODERATE 35: CPT

## 2024-03-17 PROCEDURE — 99233 SBSQ HOSP IP/OBS HIGH 50: CPT

## 2024-03-17 RX ORDER — KETOROLAC TROMETHAMINE 30 MG/ML
15 SYRINGE (ML) INJECTION EVERY 6 HOURS
Refills: 0 | Status: DISCONTINUED | OUTPATIENT
Start: 2024-03-17 | End: 2024-03-19

## 2024-03-17 RX ORDER — MORPHINE SULFATE 50 MG/1
2 CAPSULE, EXTENDED RELEASE ORAL ONCE
Refills: 0 | Status: DISCONTINUED | OUTPATIENT
Start: 2024-03-17 | End: 2024-03-17

## 2024-03-17 RX ORDER — OXYCODONE HYDROCHLORIDE 5 MG/1
5 TABLET ORAL EVERY 6 HOURS
Refills: 0 | Status: DISCONTINUED | OUTPATIENT
Start: 2024-03-17 | End: 2024-03-17

## 2024-03-17 RX ORDER — ACETAMINOPHEN 500 MG
650 TABLET ORAL EVERY 6 HOURS
Refills: 0 | Status: DISCONTINUED | OUTPATIENT
Start: 2024-03-17 | End: 2024-03-19

## 2024-03-17 RX ADMIN — OXYCODONE HYDROCHLORIDE 5 MILLIGRAM(S): 5 TABLET ORAL at 10:15

## 2024-03-17 RX ADMIN — OXYCODONE HYDROCHLORIDE 2.5 MILLIGRAM(S): 5 TABLET ORAL at 06:31

## 2024-03-17 RX ADMIN — GABAPENTIN 300 MILLIGRAM(S): 400 CAPSULE ORAL at 17:31

## 2024-03-17 RX ADMIN — Medication 2: at 12:28

## 2024-03-17 RX ADMIN — TAMSULOSIN HYDROCHLORIDE 0.8 MILLIGRAM(S): 0.4 CAPSULE ORAL at 21:20

## 2024-03-17 RX ADMIN — Medication 1: at 17:30

## 2024-03-17 RX ADMIN — Medication 1: at 08:05

## 2024-03-17 RX ADMIN — GABAPENTIN 300 MILLIGRAM(S): 400 CAPSULE ORAL at 14:22

## 2024-03-17 RX ADMIN — SIMVASTATIN 40 MILLIGRAM(S): 20 TABLET, FILM COATED ORAL at 21:21

## 2024-03-17 RX ADMIN — OXYCODONE HYDROCHLORIDE 5 MILLIGRAM(S): 5 TABLET ORAL at 09:13

## 2024-03-17 RX ADMIN — Medication 650 MILLIGRAM(S): at 00:30

## 2024-03-17 RX ADMIN — Medication 15 MILLIGRAM(S): at 17:36

## 2024-03-17 RX ADMIN — Medication 15 MILLIGRAM(S): at 18:31

## 2024-03-17 RX ADMIN — GABAPENTIN 600 MILLIGRAM(S): 400 CAPSULE ORAL at 06:30

## 2024-03-17 RX ADMIN — OXYCODONE HYDROCHLORIDE 2.5 MILLIGRAM(S): 5 TABLET ORAL at 07:30

## 2024-03-17 NOTE — CARE COORDINATION ASSESSMENT. - NSPASTMEDSURGHISTORY_GEN_ALL_CORE_FT
PAST MEDICAL & SURGICAL HISTORY:  Hyperlipidemia      Hypertension      Diabetes mellitus      DM (diabetes mellitus)      HLD (hyperlipidemia)      HTN (hypertension)      No significant past surgical history      DM (diabetes mellitus)      High cholesterol

## 2024-03-17 NOTE — CARE COORDINATION ASSESSMENT. - NSDCPLANSERVICES_GEN_ALL_CORE
This CM met with the pt at the bedside. Introduced myself as the CM explained my role and left contact information. The pt speaks mostly Wolof and gave this CM permission to call his son Fred @ [phone]. The pt lives in an apt with his wife and using a cane and rollator to ambulate. The son states that the pt's son gets paid to take care of the pt for a few hours a day, which sounds like the CDPAP program, however the son did not have the information on him and was going to ask his brother and inform the CM team tomorrow. The PCP is Dr. Espinoza Arviuz in [place] and the pharmacy is [facility]. The son will transport the pt home once medically cleared. The pt is for a HIDA scan-CCK tomorrow. CM team to follow for post acute needs./Anticipated Needs Unclear at Present

## 2024-03-17 NOTE — CHART NOTE - NSCHARTNOTEFT_GEN_A_CORE
Quiet overnight per chart.  No new events this am per RN.  Patient reports pain improved, though not resolved.  Tolerating diet without nausea or vomiting.  Vitals non-suggestive.  Chest with tenderness over rib cage and costal margin.  Only +/- tenderness to deeper palpation of RUQ without guarding, rebound or referred pain.  Labs reassuring with normal WBCs and minimal elevation of Alk Phos only.  CT concerning for gallbladder inflammation- however, no gallstones noted.  Sono concerning for adenomyomatosis of gallbladder- however, no gallstones noted.  "cholecystitis is considered unlikely" per that report.    Imp/plan-  More right flank or chest wall pain than RUQ pain.  Musculoskeletal in nature by history.  Presently without GI complaints and tolerating diet.  Exam not overly impressive.  However, given significant gallbladder thickening, will follow clinical progress and arrange for HIDA-CCK tomorrow.    Reviewed with patient in detail, Hospitalist and today's RN.    Please note that more than 35 minutes was spent in face to face time with greater than 50% in counseling and coordination of care. Quiet overnight per chart.  No new events this am per RN.  Patient reports pain improved, though not resolved.  Tolerating diet without nausea or vomiting.  Vitals non-suggestive.  Chest with tenderness over rib cage and costal margin.  Only +/- tenderness to deeper palpation of RUQ without guarding, rebound or referred pain.  Labs reassuring with normal WBCs and minimal elevation of Alk Phos only.  CT concerning for gallbladder inflammation- however, no gallstones noted.  Sono concerning for adenomyomatosis of gallbladder- however, no gallstones noted.  "cholecystitis is considered unlikely" per that report.    Imp/plan-  More right flank or chest wall pain than RUQ pain.  Musculoskeletal in nature by history.  Presently without GI complaints and tolerating diet.  Exam not overly impressive.  However, given significant gallbladder thickening, will follow clinical progress and arrange for HIDA-CCK tomorrow.    Reviewed with patient in detail, Hospitalist and today's RN.    Please note that more than 25 minutes was spent in face to face time with greater than 50% in counseling and coordination of care.

## 2024-03-17 NOTE — CARE COORDINATION ASSESSMENT. - NSCAREPROVIDERS_GEN_ALL_CORE_FT
CARE PROVIDERS:  Accepting Physician: Franco Allen  Administration: Franco Allen  Administration: Arun Sanchez  Admitting: Franco Allen  Attending: Franco Allen  Case Management: Jessica Ocampo  Consultant: Vinay Mera  Covering Team: Aries Dye  ED Attending: Dionisio Stephens  ED Nurse: Chaya Mobley  Nurse: West Fernandez  Outpatient Provider: Prashant Simpson  PCA/Nursing Assistant: Peggy Verdugo  Primary Team: Deanna Dawson  Registered Dietitian: Julio Torres  Team: CEM  Hospitalists, Team  UR// Supp. Assoc.: Mady Obregon

## 2024-03-18 LAB
ALBUMIN SERPL ELPH-MCNC: 3.9 G/DL — SIGNIFICANT CHANGE UP (ref 3.3–5)
ALP SERPL-CCNC: 127 U/L — HIGH (ref 30–120)
ALT FLD-CCNC: 22 U/L — SIGNIFICANT CHANGE UP (ref 10–60)
ANION GAP SERPL CALC-SCNC: 10 MMOL/L — SIGNIFICANT CHANGE UP (ref 5–17)
AST SERPL-CCNC: 19 U/L — SIGNIFICANT CHANGE UP (ref 10–40)
BILIRUB SERPL-MCNC: 0.6 MG/DL — SIGNIFICANT CHANGE UP (ref 0.2–1.2)
BUN SERPL-MCNC: 24 MG/DL — HIGH (ref 7–23)
CALCIUM SERPL-MCNC: 9.3 MG/DL — SIGNIFICANT CHANGE UP (ref 8.4–10.5)
CHLORIDE SERPL-SCNC: 97 MMOL/L — SIGNIFICANT CHANGE UP (ref 96–108)
CO2 SERPL-SCNC: 24 MMOL/L — SIGNIFICANT CHANGE UP (ref 22–31)
CREAT SERPL-MCNC: 1.32 MG/DL — HIGH (ref 0.5–1.3)
EGFR: 55 ML/MIN/1.73M2 — LOW
GLUCOSE BLDC GLUCOMTR-MCNC: 169 MG/DL — HIGH (ref 70–99)
GLUCOSE BLDC GLUCOMTR-MCNC: 175 MG/DL — HIGH (ref 70–99)
GLUCOSE BLDC GLUCOMTR-MCNC: 177 MG/DL — HIGH (ref 70–99)
GLUCOSE BLDC GLUCOMTR-MCNC: 191 MG/DL — HIGH (ref 70–99)
GLUCOSE BLDC GLUCOMTR-MCNC: 274 MG/DL — HIGH (ref 70–99)
GLUCOSE SERPL-MCNC: 226 MG/DL — HIGH (ref 70–99)
HCT VFR BLD CALC: 42.3 % — SIGNIFICANT CHANGE UP (ref 39–50)
HGB BLD-MCNC: 14.4 G/DL — SIGNIFICANT CHANGE UP (ref 13–17)
MCHC RBC-ENTMCNC: 30.3 PG — SIGNIFICANT CHANGE UP (ref 27–34)
MCHC RBC-ENTMCNC: 34 GM/DL — SIGNIFICANT CHANGE UP (ref 32–36)
MCV RBC AUTO: 88.9 FL — SIGNIFICANT CHANGE UP (ref 80–100)
NRBC # BLD: 0 /100 WBCS — SIGNIFICANT CHANGE UP (ref 0–0)
PLATELET # BLD AUTO: 243 K/UL — SIGNIFICANT CHANGE UP (ref 150–400)
POTASSIUM SERPL-MCNC: 4.9 MMOL/L — SIGNIFICANT CHANGE UP (ref 3.5–5.3)
POTASSIUM SERPL-SCNC: 4.9 MMOL/L — SIGNIFICANT CHANGE UP (ref 3.5–5.3)
PROT SERPL-MCNC: 7.5 G/DL — SIGNIFICANT CHANGE UP (ref 6–8.3)
RBC # BLD: 4.76 M/UL — SIGNIFICANT CHANGE UP (ref 4.2–5.8)
RBC # FLD: 11.9 % — SIGNIFICANT CHANGE UP (ref 10.3–14.5)
SODIUM SERPL-SCNC: 131 MMOL/L — LOW (ref 135–145)
WBC # BLD: 7.59 K/UL — SIGNIFICANT CHANGE UP (ref 3.8–10.5)
WBC # FLD AUTO: 7.59 K/UL — SIGNIFICANT CHANGE UP (ref 3.8–10.5)

## 2024-03-18 PROCEDURE — 99232 SBSQ HOSP IP/OBS MODERATE 35: CPT

## 2024-03-18 PROCEDURE — 78227 HEPATOBIL SYST IMAGE W/DRUG: CPT | Mod: 26

## 2024-03-18 PROCEDURE — 99233 SBSQ HOSP IP/OBS HIGH 50: CPT

## 2024-03-18 RX ORDER — SODIUM CHLORIDE 9 MG/ML
1000 INJECTION, SOLUTION INTRAVENOUS
Refills: 0 | Status: DISCONTINUED | OUTPATIENT
Start: 2024-03-18 | End: 2024-03-19

## 2024-03-18 RX ORDER — SINCALIDE 5 UG/5ML
1.7 INJECTION, POWDER, LYOPHILIZED, FOR SOLUTION INTRAVENOUS ONCE
Refills: 0 | Status: COMPLETED | OUTPATIENT
Start: 2024-03-18 | End: 2024-03-18

## 2024-03-18 RX ADMIN — SIMVASTATIN 40 MILLIGRAM(S): 20 TABLET, FILM COATED ORAL at 22:06

## 2024-03-18 RX ADMIN — SINCALIDE 51.7 MICROGRAM(S): 5 INJECTION, POWDER, LYOPHILIZED, FOR SOLUTION INTRAVENOUS at 12:10

## 2024-03-18 RX ADMIN — SODIUM CHLORIDE 75 MILLILITER(S): 9 INJECTION, SOLUTION INTRAVENOUS at 13:39

## 2024-03-18 RX ADMIN — GABAPENTIN 300 MILLIGRAM(S): 400 CAPSULE ORAL at 17:51

## 2024-03-18 RX ADMIN — GABAPENTIN 300 MILLIGRAM(S): 400 CAPSULE ORAL at 14:48

## 2024-03-18 RX ADMIN — GABAPENTIN 600 MILLIGRAM(S): 400 CAPSULE ORAL at 05:55

## 2024-03-18 RX ADMIN — Medication 3: at 17:12

## 2024-03-18 RX ADMIN — SODIUM CHLORIDE 75 MILLILITER(S): 9 INJECTION, SOLUTION INTRAVENOUS at 22:06

## 2024-03-18 RX ADMIN — TAMSULOSIN HYDROCHLORIDE 0.8 MILLIGRAM(S): 0.4 CAPSULE ORAL at 22:06

## 2024-03-18 NOTE — PROGRESS NOTE ADULT - ASSESSMENT
79M with PMHx of T2DM, prior back surgeries (on gabapentin for neuropathy), and BPH presenting for right flank pain.  In the ED concerned for possible PE so he had CTA Chest/Abd/Pelvis. No PE seen, but incidentally found to have thick-walled gallbladder with mild adjacent inflammation. Patient had RUQ sono which showed: "Marked wall thickening of the gallbladder fundus, likely representing adenomyomatosis or third spacing." Patient admitted for further management of right flank pain.    #Right Flank Pain  - Doubt UTI/pyelo, UA without pyuria  - Doubt cholecystitis--w/o fever or leukocytosis or transaminitis. NO RUQ pain. RUQ sono not concerning. Surgery consulted (monitor off abx for now)  - Doubt cardiac--trop neg  - Possible MSK? Tylenol PRN with lidocaine patch  - Plan for NM HIDA    #T2DM  - Hold home metformin and Januvia  - FS TID AC & insulin sliding scale    #BPH  - Takes Alfuzosin 10 mg qhs at home  - Start Flomax 0.8 mg qhs 
79M with PMHx of T2DM, prior back surgeries (on gabapentin for neuropathy), and BPH presenting for right flank pain.  In the ED concerned for possible PE so he had CTA Chest/Abd/Pelvis. No PE seen, but incidentally found to have thick-walled gallbladder with mild adjacent inflammation. Patient had RUQ sono which showed: "Marked wall thickening of the gallbladder fundus, likely representing adenomyomatosis or third spacing." Patient admitted for further management of right flank pain.    #Right Flank Pain  - Doubt UTI/pyelo, UA without pyuria  - Doubt cholecystitis--w/o fever or leukocytosis or transaminitis. NO RUQ pain. RUQ sono not concerning. Surgery consulted (monitor off abx for now)  HIDA scan negative  resume diet, will monitor if tolerates d/c plannig n    #T2DM  - Hold home metformin and Januvia  - FS TID AC & insulin sliding scale    #BPH  - Takes Alfuzosin 10 mg qhs at home  - Start Flomax 0.8 mg qhs 
Surg. Att.  I agree with above note.  No surgical intervention is planned.

## 2024-03-18 NOTE — PROGRESS NOTE ADULT - SUBJECTIVE AND OBJECTIVE BOX
SURGERY    79 year old male admitted for right flank pain    SUBJECTIVE:   Patient seen and examined lying in bed.  No overnight events.  Currently NPO for HIDA scan this morning, but was tolerating a regular diet and denies N/V or abdominal pain at this time.    OBJECTIVE:   T(F): 98.2 (03-18-24 @ 07:20), Max: 98.2 (03-18-24 @ 07:20)  HR: 73 (03-18-24 @ 07:20) (72 - 81)  BP: 120/76 (03-18-24 @ 07:20) (120/76 - 165/87)  RR: 18 (03-18-24 @ 07:20) (17 - 18)  SpO2: 96% (03-18-24 @ 07:20) (94% - 96%)    CAPILLARY BLOOD GLUCOSE  POCT Blood Glucose.: 191 mg/dL (18 Mar 2024 07:17)  POCT Blood Glucose.: 177 mg/dL (18 Mar 2024 05:04)  POCT Blood Glucose.: 185 mg/dL (17 Mar 2024 21:51)  POCT Blood Glucose.: 170 mg/dL (17 Mar 2024 16:53)  POCT Blood Glucose.: 219 mg/dL (17 Mar 2024 12:26)    I&O's Detail  17 Mar 2024 07:01  -  18 Mar 2024 07:00  --------------------------------------------------------  IN:  Total IN: 0 mL  OUT:    Voided (mL): 900 mL  Total OUT: 900 mL  Total NET: -900 mL    Physical exam:  General: A+O x 3 in NAD  Chest: Equal chest expansion, non labored breathing  Heart: Pulse regular  Abdomen: soft, non distended, mild RUQ tenderness to deep palpation, no guarding, no rebound tenderness.  Extremities: no edema noted, warm,  no calf tenderness     MEDICATIONS  (STANDING):  dextrose 5%. 1000 milliLiter(s) (50 mL/Hr) IV Continuous <Continuous>  dextrose 5%. 1000 milliLiter(s) (100 mL/Hr) IV Continuous <Continuous>  dextrose 50% Injectable 25 Gram(s) IV Push once  dextrose 50% Injectable 12.5 Gram(s) IV Push once  dextrose 50% Injectable 25 Gram(s) IV Push once  gabapentin 600 milliGRAM(s) Oral <User Schedule>  gabapentin 300 milliGRAM(s) Oral <User Schedule>  glucagon  Injectable 1 milliGRAM(s) IntraMuscular once  insulin lispro (ADMELOG) corrective regimen sliding scale   SubCutaneous three times a day before meals  insulin lispro (ADMELOG) corrective regimen sliding scale   SubCutaneous at bedtime  simvastatin 40 milliGRAM(s) Oral at bedtime  tamsulosin 0.8 milliGRAM(s) Oral at bedtime    MEDICATIONS  (PRN):  acetaminophen     Tablet .. 650 milliGRAM(s) Oral every 6 hours PRN Temp greater or equal to 38C (100.4F), Mild Pain (1 - 3)  dextrose Oral Gel 15 Gram(s) Oral once PRN Blood Glucose LESS THAN 70 milliGRAM(s)/deciliter  ketorolac   Injectable 15 milliGRAM(s) IV Push every 6 hours PRN Moderate Pain (4 - 6)      LABS:                        14.4   7.59  )-----------( 243      ( 18 Mar 2024 06:52 )             42.3     03-18    131<L>  |  97  |  24<H>  ----------------------------<  226<H>  4.9   |  24  |  1.32<H>    Ca    9.3      18 Mar 2024 06:52    TPro  7.5  /  Alb  3.9  /  TBili  0.6  /  DBili  x   /  AST  19  /  ALT  22  /  AlkPhos  127<H>  03-18    PT/INR - ( 16 Mar 2024 14:30 )   PT: 10.7 sec;   INR: 0.95 ratio         PTT - ( 16 Mar 2024 14:30 )  PTT:30.1 sec  Urinalysis Basic - ( 18 Mar 2024 06:52 )    Color: x / Appearance: x / SG: x / pH: x  Gluc: 226 mg/dL / Ketone: x  / Bili: x / Urobili: x   Blood: x / Protein: x / Nitrite: x   Leuk Esterase: x / RBC: x / WBC x   Sq Epi: x / Non Sq Epi: x / Bacteria: x    RADIOLOGY & ADDITIONAL STUDIES:  < from: CT Abdomen and Pelvis w/ IV Cont (03.16.24 @ 15:44) >  ACC: [id number] EXAM:  CT ABDOMEN AND PELVIS IC   ORDERED BY: RON CHOE     ACC: [id number] EXAM:  CT ANGIO CHEST PULM ART WAWIC   ORDERED BY: RON CHOE     PROCEDURE DATE:  03/16/2024          INTERPRETATION:  CLINICAL INFORMATION: 79-year-old male with right chest   and flank pain. History of chronic pain and neuropathy    COMPARISON: None.    CONTRAST/COMPLICATIONS:  IV Contrast: Omnipaque 350 (accession [id number]), IV contrast documented   in unlinked concurrent exam (accession [id number])  90 cc administered   10   cc discarded  Oral Contrast: NONE  Complications: None reported at time of study completion    PROCEDURE:  CT Angiography of the Chest was performed followed by portal venous phase   imaging of the Abdomen and Pelvis.  Sagittal and coronal reformats were performed as well as 3D (MIP)   reconstructions.    FINDINGS:  CHEST:  LUNGS AND LARGE AIRWAYS: Patent central airways. No pulmonary nodules.  PLEURA: No pleural effusion.  VESSELS: No pulmonary embolus.  HEART: Mild cardiomegaly. No pericardial effusion. Coronary artery   calcification.  MEDIASTINUM AND JEMAL: No lymphadenopathy.  CHEST WALL AND LOWER NECK: Within normal limits.    ABDOMEN AND PELVIS:  LIVER: Within normal limits.  BILE DUCTS: Normalcaliber.  GALLBLADDER: Mural thickening with mild pericholecystic inflammation.   Suggest further evaluation with ultrasound.  SPLEEN: Within normal limits.  PANCREAS: Within normal limits.  ADRENALS: Within normal limits.  KIDNEYS/URETERS: Right renal cyst.    BLADDER: Within normal limits.  REPRODUCTIVE ORGANS: Normal-sized prostate.    BOWEL: No bowel obstruction. Appendix not visualized. No evidence   appendicitis.  PERITONEUM: No ascites.  VESSELS: Within normal limits.  RETROPERITONEUM/LYMPH NODES: No lymphadenopathy.  ABDOMINAL WALL: Within normal limits.  BONES: Degenerative change. Fusion T10-S1.    IMPRESSION:  Thick-walled gallbladder with mild adjacent inflammation. Suggest further   evaluation with ultrasound    --- End of Report ---    LATESHA PERALES MD; Attending Radiologist  This document has been electronically signed. Mar 16 2024  4:14PM    < from: US Abdomen Upper Quadrant Right (03.16.24 @ 17:43) >  ACC: [id number] EXAM:  US ABDOMEN RT UPR QUADRANT   ORDERED BY: RON CHOE     PROCEDURE DATE:  03/16/2024          INTERPRETATION:  CLINICAL INFORMATION: Right flank pain, gallbladder wall   thickening and CT.    COMPARISON: CT chest, abdomen, and pelvis performed earlier today.    TECHNIQUE: Sonography of the right upper quadrant.    FINDINGS:  Liver: The liver is heterogeneous and increased in echogenicity, likely   due to steatosis.  Bile ducts: There is marked wall thickening of the gallbladder fundus,   favored to represent adenomyomatosis. No gallstones identified. Common   bile duct measures 5 mm.  Gallbladder: Within normal limits.  Pancreas: Visualized portions are within normal limits.  Right kidney: 10.2 cm. No hydronephrosis. There is a 3.7 x 4.3 x 4.6 cm   simple right upper pole cyst.  Ascites: None.  IVC: Visualized portions are within normal limits.    IMPRESSION:  Marked wall thickening of the gallbladder fundus, likely representing   adenomyomatosis or third spacing. Cholecystitis is considered unlikely.    Normal right kidney without evidence of hydronephrosis.    Diffuse hepatic steatosis.    --- End of Report ---    THOM FRANCIS MD; Attending Radiologist  This document has been electronically signed. Mar 16 2024  6:15PM      HIDA PENDING    Assessment  Patient is an 79 year old  Male who presents with a chief complaint of Right Flank Pain, with CT concerning for gallbladder inflammation- however, no gallstones noted.  Sono concerning for adenomyomatosis of gallbladder- however, no gallstones noted.  "cholecystitis is considered unlikely" per that report.    HIDA CCK pending for this morning  Further recommendations pending HIDA scan report  Case and plan D/W Dr. Dye    
Patient is a 79y old  Male who presents with a chief complaint of Right Flank Pain (16 Mar 2024 22:01)    Bulgarian : [id number]    SUBJECTIVE / OVERNIGHT EVENTS: Patient seen and examined at bedside. No acute events overnight. Still with right flank pain, though may localize to RUQ now. No fever, chills, nausea, or vomiting. Tolerating diet.    MEDICATIONS  (STANDING):  dextrose 5%. 1000 milliLiter(s) (100 mL/Hr) IV Continuous <Continuous>  dextrose 5%. 1000 milliLiter(s) (50 mL/Hr) IV Continuous <Continuous>  dextrose 50% Injectable 25 Gram(s) IV Push once  dextrose 50% Injectable 12.5 Gram(s) IV Push once  dextrose 50% Injectable 25 Gram(s) IV Push once  gabapentin 300 milliGRAM(s) Oral <User Schedule>  gabapentin 600 milliGRAM(s) Oral <User Schedule>  glucagon  Injectable 1 milliGRAM(s) IntraMuscular once  insulin lispro (ADMELOG) corrective regimen sliding scale   SubCutaneous three times a day before meals  insulin lispro (ADMELOG) corrective regimen sliding scale   SubCutaneous at bedtime  simvastatin 40 milliGRAM(s) Oral at bedtime  tamsulosin 0.8 milliGRAM(s) Oral at bedtime    MEDICATIONS  (PRN):  acetaminophen     Tablet .. 650 milliGRAM(s) Oral every 6 hours PRN Temp greater or equal to 38C (100.4F), Mild Pain (1 - 3), Moderate Pain (4 - 6)  dextrose Oral Gel 15 Gram(s) Oral once PRN Blood Glucose LESS THAN 70 milliGRAM(s)/deciliter  oxyCODONE    IR 5 milliGRAM(s) Oral every 6 hours PRN Severe Pain (7 - 10)      CAPILLARY BLOOD GLUCOSE      POCT Blood Glucose.: 189 mg/dL (17 Mar 2024 07:56)  POCT Blood Glucose.: 206 mg/dL (16 Mar 2024 21:37)  POCT Blood Glucose.: 127 mg/dL (16 Mar 2024 19:36)  POCT Blood Glucose.: 179 mg/dL (16 Mar 2024 13:35)    I&O's Summary      PHYSICAL EXAM:  Vital Signs Last 24 Hrs  T(C): 36.4 (17 Mar 2024 08:13), Max: 36.8 (16 Mar 2024 13:35)  T(F): 97.6 (17 Mar 2024 08:13), Max: 98.3 (16 Mar 2024 13:35)  HR: 73 (17 Mar 2024 08:13) (73 - 86)  BP: 125/78 (17 Mar 2024 08:13) (125/78 - 158/82)  BP(mean): --  RR: 16 (17 Mar 2024 08:13) (15 - 16)  SpO2: 94% (17 Mar 2024 08:13) (94% - 99%)    Parameters below as of 17 Mar 2024 08:13  Patient On (Oxygen Delivery Method): room air        GEN: male in NAD, appears comfortable, no diaphoresis  EYES: No scleral injection, EOMI  ENTM: neck supple & symmetric without tracheal deviation, moist membranes, no gross hearing impairment, thyroid gland not enlarged  CV: +S1/S2, no m/r/g, no abdominal bruit, no LE edema  RESP: breathing comfortably, no respiratory accessory muscle use, CTAB, no w/r/r  GI: normoactive BS, soft, ND, no rebounding/guarding, no palpable masses; +tender to palpation in RUQ    LABS:                        13.9   8.30  )-----------( 239      ( 17 Mar 2024 06:39 )             41.7     03-17    129<L>  |  97  |  18  ----------------------------<  179<H>  4.6   |  27  |  1.03    Ca    8.9      17 Mar 2024 06:39    TPro  7.3  /  Alb  4.0  /  TBili  0.9  /  DBili  x   /  AST  16  /  ALT  26  /  AlkPhos  122<H>  03-17    PT/INR - ( 16 Mar 2024 14:30 )   PT: 10.7 sec;   INR: 0.95 ratio         PTT - ( 16 Mar 2024 14:30 )  PTT:30.1 sec      Urinalysis Basic - ( 17 Mar 2024 06:39 )    Color: x / Appearance: x / SG: x / pH: x  Gluc: 179 mg/dL / Ketone: x  / Bili: x / Urobili: x   Blood: x / Protein: x / Nitrite: x   Leuk Esterase: x / RBC: x / WBC x   Sq Epi: x / Non Sq Epi: x / Bacteria: x          RADIOLOGY & ADDITIONAL TESTS:  Results Reviewed:   Imaging Personally Reviewed:  Electrocardiogram Personally Reviewed:    COORDINATION OF CARE:  Care Discussed with Consultants/Other Providers [Y/N]:  Prior or Outpatient Records Reviewed [Y/N]:  
Patient is a 79y old  Male who presents with a chief complaint of Right Flank Pain (18 Mar 2024 10:02)        HPI:  79M with PMHx of T2DM, prior back surgeries (on gabapentin for neuropathy), and BPH presenting for right flank pain. Patient states pain started on the day of admission. Located in the back near his right floating rib. Denies trauma. He states pain is worse when he takes in a deep breath and is positional. Denies fever, chills, nausea, or vomiting. No relation to food. Unchanged bowel habits, states he's constipated. In the ED concerned for possible PE so he had CTA Chest/Abd/Pelvis. No PE seen, but incidentally found to have thick-walled gallbladder with mild adjacent inflammation. Patient had RUQ sono which showed: "Marked wall thickening of the gallbladder fundus, likely representing adenomyomatosis or third spacing. Cholecystitis is considered unlikely." In the ED he was given 1 L NS and Tylenol. (16 Mar 2024 18:54)      SUBJECTIVE & OBJECTIVE: Pt seen and examined at bedside. nad    PHYSICAL EXAM:  T(C): 36.8 (03-18-24 @ 07:20), Max: 36.8 (03-18-24 @ 07:20)  HR: 81 (03-18-24 @ 13:20) (72 - 81)  BP: 115/72 (03-18-24 @ 13:20) (115/72 - 165/87)  RR: 18 (03-18-24 @ 13:20) (17 - 18)  SpO2: 93% (03-18-24 @ 13:20) (93% - 96%)  Wt(kg): --   GENERAL: NAD, well-groomed, well-developed  HEAD:  Atraumatic, Normocephalic  EYES: EOMI, PERRLA, conjunctiva and sclera clear  ENMT: Moist mucous membranes  NECK: Supple, No JVD  NERVOUS SYSTEM:  Alert & Oriented X3, Motor Strength 5/5 B/L upper and lower extremities; DTRs 2+ intact and symmetric  CHEST/LUNG: Clear to auscultation bilaterally; No rales, rhonchi, wheezing, or rubs  HEART: Regular rate and rhythm; No murmurs, rubs, or gallops  ABDOMEN: Soft, Nontender, Nondistended; Bowel sounds present  EXTREMITIES:  2+ Peripheral Pulses, No clubbing, cyanosis, or edema        MEDICATIONS  (STANDING):  dextrose 5% + sodium chloride 0.45%. 1000 milliLiter(s) (75 mL/Hr) IV Continuous <Continuous>  dextrose 5%. 1000 milliLiter(s) (100 mL/Hr) IV Continuous <Continuous>  dextrose 5%. 1000 milliLiter(s) (50 mL/Hr) IV Continuous <Continuous>  dextrose 50% Injectable 25 Gram(s) IV Push once  dextrose 50% Injectable 12.5 Gram(s) IV Push once  dextrose 50% Injectable 25 Gram(s) IV Push once  gabapentin 600 milliGRAM(s) Oral <User Schedule>  gabapentin 300 milliGRAM(s) Oral <User Schedule>  glucagon  Injectable 1 milliGRAM(s) IntraMuscular once  insulin lispro (ADMELOG) corrective regimen sliding scale   SubCutaneous three times a day before meals  insulin lispro (ADMELOG) corrective regimen sliding scale   SubCutaneous at bedtime  simvastatin 40 milliGRAM(s) Oral at bedtime  tamsulosin 0.8 milliGRAM(s) Oral at bedtime    MEDICATIONS  (PRN):  acetaminophen     Tablet .. 650 milliGRAM(s) Oral every 6 hours PRN Temp greater or equal to 38C (100.4F), Mild Pain (1 - 3)  dextrose Oral Gel 15 Gram(s) Oral once PRN Blood Glucose LESS THAN 70 milliGRAM(s)/deciliter  ketorolac   Injectable 15 milliGRAM(s) IV Push every 6 hours PRN Moderate Pain (4 - 6)      LABS:                        14.4   7.59  )-----------( 243      ( 18 Mar 2024 06:52 )             42.3     03-18    131<L>  |  97  |  24<H>  ----------------------------<  226<H>  4.9   |  24  |  1.32<H>    Ca    9.3      18 Mar 2024 06:52    TPro  7.5  /  Alb  3.9  /  TBili  0.6  /  DBili  x   /  AST  19  /  ALT  22  /  AlkPhos  127<H>  03-18      Urinalysis Basic - ( 18 Mar 2024 06:52 )    Color: x / Appearance: x / SG: x / pH: x  Gluc: 226 mg/dL / Ketone: x  / Bili: x / Urobili: x   Blood: x / Protein: x / Nitrite: x   Leuk Esterase: x / RBC: x / WBC x   Sq Epi: x / Non Sq Epi: x / Bacteria: x        CAPILLARY BLOOD GLUCOSE      POCT Blood Glucose.: 169 mg/dL (18 Mar 2024 13:22)  POCT Blood Glucose.: 191 mg/dL (18 Mar 2024 07:17)  POCT Blood Glucose.: 177 mg/dL (18 Mar 2024 05:04)  POCT Blood Glucose.: 185 mg/dL (17 Mar 2024 21:51)  POCT Blood Glucose.: 170 mg/dL (17 Mar 2024 16:53)      CAPILLARY BLOOD GLUCOSE      POCT Blood Glucose.: 169 mg/dL (18 Mar 2024 13:22)  POCT Blood Glucose.: 191 mg/dL (18 Mar 2024 07:17)  POCT Blood Glucose.: 177 mg/dL (18 Mar 2024 05:04)  POCT Blood Glucose.: 185 mg/dL (17 Mar 2024 21:51)  POCT Blood Glucose.: 170 mg/dL (17 Mar 2024 16:53)    CAPILLARY BLOOD GLUCOSE      POCT Blood Glucose.: 169 mg/dL (18 Mar 2024 13:22)            RECENT CULTURES:      RADIOLOGY & ADDITIONAL TESTS:                        DVT/GI ppx  Discussed with pt @ bedside

## 2024-03-19 ENCOUNTER — TRANSCRIPTION ENCOUNTER (OUTPATIENT)
Age: 79
End: 2024-03-19

## 2024-03-19 VITALS
HEART RATE: 94 BPM | DIASTOLIC BLOOD PRESSURE: 72 MMHG | OXYGEN SATURATION: 98 % | RESPIRATION RATE: 19 BRPM | SYSTOLIC BLOOD PRESSURE: 129 MMHG | TEMPERATURE: 98 F

## 2024-03-19 LAB
ALBUMIN SERPL ELPH-MCNC: 3.8 G/DL — SIGNIFICANT CHANGE UP (ref 3.3–5)
ALP SERPL-CCNC: 114 U/L — SIGNIFICANT CHANGE UP (ref 30–120)
ALT FLD-CCNC: 26 U/L — SIGNIFICANT CHANGE UP (ref 10–60)
ANION GAP SERPL CALC-SCNC: 5 MMOL/L — SIGNIFICANT CHANGE UP (ref 5–17)
AST SERPL-CCNC: 20 U/L — SIGNIFICANT CHANGE UP (ref 10–40)
BILIRUB SERPL-MCNC: 0.6 MG/DL — SIGNIFICANT CHANGE UP (ref 0.2–1.2)
BUN SERPL-MCNC: 23 MG/DL — SIGNIFICANT CHANGE UP (ref 7–23)
CALCIUM SERPL-MCNC: 8.9 MG/DL — SIGNIFICANT CHANGE UP (ref 8.4–10.5)
CHLORIDE SERPL-SCNC: 99 MMOL/L — SIGNIFICANT CHANGE UP (ref 96–108)
CO2 SERPL-SCNC: 27 MMOL/L — SIGNIFICANT CHANGE UP (ref 22–31)
CREAT SERPL-MCNC: 1.18 MG/DL — SIGNIFICANT CHANGE UP (ref 0.5–1.3)
EGFR: 62 ML/MIN/1.73M2 — SIGNIFICANT CHANGE UP
GLUCOSE BLDC GLUCOMTR-MCNC: 202 MG/DL — HIGH (ref 70–99)
GLUCOSE BLDC GLUCOMTR-MCNC: 205 MG/DL — HIGH (ref 70–99)
GLUCOSE BLDC GLUCOMTR-MCNC: 218 MG/DL — HIGH (ref 70–99)
GLUCOSE SERPL-MCNC: 223 MG/DL — HIGH (ref 70–99)
HCT VFR BLD CALC: 42.1 % — SIGNIFICANT CHANGE UP (ref 39–50)
HGB BLD-MCNC: 14.2 G/DL — SIGNIFICANT CHANGE UP (ref 13–17)
MCHC RBC-ENTMCNC: 30.1 PG — SIGNIFICANT CHANGE UP (ref 27–34)
MCHC RBC-ENTMCNC: 33.7 GM/DL — SIGNIFICANT CHANGE UP (ref 32–36)
MCV RBC AUTO: 89.2 FL — SIGNIFICANT CHANGE UP (ref 80–100)
NRBC # BLD: 0 /100 WBCS — SIGNIFICANT CHANGE UP (ref 0–0)
PLATELET # BLD AUTO: 269 K/UL — SIGNIFICANT CHANGE UP (ref 150–400)
POTASSIUM SERPL-MCNC: 4.7 MMOL/L — SIGNIFICANT CHANGE UP (ref 3.5–5.3)
POTASSIUM SERPL-SCNC: 4.7 MMOL/L — SIGNIFICANT CHANGE UP (ref 3.5–5.3)
PROT SERPL-MCNC: 7.4 G/DL — SIGNIFICANT CHANGE UP (ref 6–8.3)
RBC # BLD: 4.72 M/UL — SIGNIFICANT CHANGE UP (ref 4.2–5.8)
RBC # FLD: 11.9 % — SIGNIFICANT CHANGE UP (ref 10.3–14.5)
SODIUM SERPL-SCNC: 131 MMOL/L — LOW (ref 135–145)
WBC # BLD: 7.17 K/UL — SIGNIFICANT CHANGE UP (ref 3.8–10.5)
WBC # FLD AUTO: 7.17 K/UL — SIGNIFICANT CHANGE UP (ref 3.8–10.5)

## 2024-03-19 PROCEDURE — 99239 HOSP IP/OBS DSCHRG MGMT >30: CPT

## 2024-03-19 PROCEDURE — 97161 PT EVAL LOW COMPLEX 20 MIN: CPT

## 2024-03-19 PROCEDURE — 93005 ELECTROCARDIOGRAM TRACING: CPT

## 2024-03-19 PROCEDURE — 85025 COMPLETE CBC W/AUTO DIFF WBC: CPT

## 2024-03-19 PROCEDURE — 71275 CT ANGIOGRAPHY CHEST: CPT | Mod: MC

## 2024-03-19 PROCEDURE — 83036 HEMOGLOBIN GLYCOSYLATED A1C: CPT

## 2024-03-19 PROCEDURE — 96365 THER/PROPH/DIAG IV INF INIT: CPT

## 2024-03-19 PROCEDURE — 85027 COMPLETE CBC AUTOMATED: CPT

## 2024-03-19 PROCEDURE — 81003 URINALYSIS AUTO W/O SCOPE: CPT

## 2024-03-19 PROCEDURE — 74177 CT ABD & PELVIS W/CONTRAST: CPT | Mod: MC

## 2024-03-19 PROCEDURE — 76705 ECHO EXAM OF ABDOMEN: CPT

## 2024-03-19 PROCEDURE — 85730 THROMBOPLASTIN TIME PARTIAL: CPT

## 2024-03-19 PROCEDURE — 71045 X-RAY EXAM CHEST 1 VIEW: CPT

## 2024-03-19 PROCEDURE — 84484 ASSAY OF TROPONIN QUANT: CPT

## 2024-03-19 PROCEDURE — 85379 FIBRIN DEGRADATION QUANT: CPT

## 2024-03-19 PROCEDURE — 99285 EMERGENCY DEPT VISIT HI MDM: CPT

## 2024-03-19 PROCEDURE — 36415 COLL VENOUS BLD VENIPUNCTURE: CPT

## 2024-03-19 PROCEDURE — 82962 GLUCOSE BLOOD TEST: CPT

## 2024-03-19 PROCEDURE — 78227 HEPATOBIL SYST IMAGE W/DRUG: CPT

## 2024-03-19 PROCEDURE — 85610 PROTHROMBIN TIME: CPT

## 2024-03-19 PROCEDURE — 80053 COMPREHEN METABOLIC PANEL: CPT

## 2024-03-19 PROCEDURE — A9537: CPT

## 2024-03-19 RX ADMIN — GABAPENTIN 300 MILLIGRAM(S): 400 CAPSULE ORAL at 14:00

## 2024-03-19 RX ADMIN — GABAPENTIN 600 MILLIGRAM(S): 400 CAPSULE ORAL at 07:00

## 2024-03-19 RX ADMIN — Medication 2: at 16:45

## 2024-03-19 RX ADMIN — Medication 2: at 08:04

## 2024-03-19 NOTE — DISCHARGE NOTE NURSING/CASE MANAGEMENT/SOCIAL WORK - NSDCPEFALRISK_GEN_ALL_CORE
For information on Fall & Injury Prevention, visit: [email or web address] OR  [email or web address] OR  https://www.cdc.gov/steadi/patient.html

## 2024-03-19 NOTE — PHYSICAL THERAPY INITIAL EVALUATION ADULT - ADDITIONAL COMMENTS
Pt lives in senior apartment building with no steps to negotiate. PTA pt was ambulating independently with cane. Spouse can assist at home if needed. Owns a cane.

## 2024-03-19 NOTE — PHYSICAL THERAPY INITIAL EVALUATION ADULT - PERTINENT HX OF CURRENT PROBLEM, REHAB EVAL
Reason for Admission: Right Flank Pain  History of Present Illness:   79M with PMHx of T2DM, prior back surgeries (on gabapentin for neuropathy), and BPH presenting for right flank pain. Patient states pain started on the day of admission. Located in the back near his right floating rib. Denies trauma. He states pain is worse when he takes in a deep breath and is positional. Denies fever, chills, nausea, or vomiting. No relation to food. Unchanged bowel habits, states he's constipated. In the ED concerned for possible PE so he had CTA Chest/Abd/Pelvis. No PE seen, but incidentally found to have thick-walled gallbladder with mild adjacent inflammation. Patient had RUQ sono which showed: "Marked wall thickening of the gallbladder fundus, likely representing adenomyomatosis or third spacing. Cholecystitis is considered unlikely." In the ED he was given 1 L NS and Tylenol.

## 2024-03-19 NOTE — CAREGIVER ENGAGEMENT NOTE - CAREGIVER OUTREACH NOTES - FREE TEXT
CM speak to caregiver to explain role and transitions of care. Patient for possible discharge, CDPAP aide LAURA needed. CDPAP Aide agency- [facility] [phone] fax#[phone]. Discharge paper to be faxed to Brinda [facility] for LAURA. IMM discussed with patient and Son (Fred) both in agreement with transition to home at this time. All questions answered to the best of my abilities.  CM remains available throughout the hospital stay.

## 2024-03-19 NOTE — DISCHARGE NOTE PROVIDER - CARE PROVIDER_API CALL
Stiven Young  Gastroenterology  [address] [place] [address]  Phone: [phone]  Fax: [phone]  Follow Up Time:

## 2024-03-19 NOTE — DISCHARGE NOTE PROVIDER - ATTENDING DISCHARGE PHYSICAL EXAMINATION:
PHYSICAL EXAM:  Vital Signs Last 24 Hrs  T(C): 36.5 (19 Mar 2024 15:00), Max: 36.8 (19 Mar 2024 07:43)  T(F): 97.7 (19 Mar 2024 15:00), Max: 98.3 (19 Mar 2024 07:43)  HR: 94 (19 Mar 2024 15:00) (67 - 94)  BP: 129/72 (19 Mar 2024 15:00) (129/72 - 159/81)  BP(mean): --  RR: 19 (19 Mar 2024 15:00) (17 - 19)  SpO2: 98% (19 Mar 2024 15:00) (92% - 98%)    Parameters below as of 19 Mar 2024 14:44  Patient On (Oxygen Delivery Method): room air        GENERAL: NAD  HEAD:  Atraumatic, Normocephalic  ENMT: Federico Mucous Membranes  NECK: Supple, No JVD, Normal thyroid  HEART: Regular rate and rhythm; No murmurs, rubs, or gallops  CHEST/LUNG: Clear to auscultation bilaterally; No rales, rhonchi, wheezing, or rubs  ABDOMEN: Soft, Nontender, Nondistended; Bowel sounds present  EXTREMITIES:  2+ Peripheral Pulses, No clubbing, cyanosis, or edema  SKIN: No rashes or lesions

## 2024-03-19 NOTE — DISCHARGE NOTE PROVIDER - NSDCCPCAREPLAN_GEN_ALL_CORE_FT
PRINCIPAL DISCHARGE DIAGNOSIS  Diagnosis: Right flank pain  Assessment and Plan of Treatment: Extensive workup was done. UTI and Cholecysitits was ruled out. You were evaluated by our surgeon as well.  Pain had resolved and you were able to tolerate food.  It is recommended that you visit with a gastroenterologist for any further management.

## 2024-03-19 NOTE — DISCHARGE NOTE PROVIDER - HOSPITAL COURSE
79M with PMHx of T2DM, prior back surgeries (on gabapentin for neuropathy), and BPH presenting for right flank pain.  In the ED concerned for possible PE so he had CTA Chest/Abd/Pelvis. No PE seen, but incidentally found to have thick-walled gallbladder with mild adjacent inflammation. Patient had RUQ sono which showed: "Marked wall thickening of the gallbladder fundus, likely representing adenomyomatosis or third spacing." Patient admitted for further management of right flank pain.    #Right Flank Pain  - Doubt UTI/pyelo, UA without pyuria  - Doubt cholecystitis--w/o fever or leukocytosis or transaminitis. NO RUQ pain. RUQ sono not concerning. Surgery consulted (monitor off abx for now)  HIDA scan negative  resume diet, will monitor if tolerates d/c plannig n    #T2DM  - Hold home metformin and Januvia  - FS TID AC & insulin sliding scale    #BPH  - Takes Alfuzosin 10 mg qhs at home  - Start Flomax 0.8 mg qhs    79M with PMHx of T2DM, prior back surgeries (on gabapentin for neuropathy), and BPH presenting for right flank pain.  In the ED concerned for possible PE so he had CTA Chest/Abd/Pelvis. No PE seen, but incidentally found to have thick-walled gallbladder with mild adjacent inflammation. Patient had RUQ sono which showed: "Marked wall thickening of the gallbladder fundus, likely representing adenomyomatosis or third spacing." Patient admitted for further management of right flank pain.    #Right Flank Pain  - Doubt UTI/pyelo, UA without pyuria  - Doubt cholecystitis--w/o fever or leukocytosis or transaminitis. NO RUQ pain. RUQ sono not concerning. Surgery consulted (monitor off abx for now)  HIDA scan negative  Diet tolerated.  No pain.  Will refer him to GI outpatient for possible EGD if needed    #T2DM  resume home meds    #BPH  - Takes Alfuzosin 10 mg qhs at home

## 2024-03-19 NOTE — DISCHARGE NOTE NURSING/CASE MANAGEMENT/SOCIAL WORK - NSDCVIVACCINE_GEN_ALL_CORE_FT
Tdap; 19-Dec-2022 08:54; Colletta, Morgan (TIA); [employer]; [id number] (Exp. Date: 08-Dec-2024); IntraMuscular; Deltoid Right.; 0.5 milliLiter(s); VIS (VIS Published: 09-May-2013, VIS Presented: 19-Dec-2022);

## 2024-03-19 NOTE — DISCHARGE NOTE NURSING/CASE MANAGEMENT/SOCIAL WORK - PATIENT PORTAL LINK FT
You can access the FollowMyHealth Patient Portal offered by [facility] by registering at the following website: [email or web address]. By joining [facility]’s FollowMyHealth portal, you will also be able to view your health information using other applications (apps) compatible with our system.
